# Patient Record
Sex: MALE | Race: WHITE | NOT HISPANIC OR LATINO | Employment: OTHER | URBAN - METROPOLITAN AREA
[De-identification: names, ages, dates, MRNs, and addresses within clinical notes are randomized per-mention and may not be internally consistent; named-entity substitution may affect disease eponyms.]

---

## 2021-07-19 ENCOUNTER — TRANSCRIBE ORDERS (OUTPATIENT)
Dept: ADMINISTRATIVE | Facility: HOSPITAL | Age: 61
End: 2021-07-19

## 2021-07-19 DIAGNOSIS — C20 RECTAL CANCER (HCC): Primary | ICD-10-CM

## 2021-07-29 ENCOUNTER — HOSPITAL ENCOUNTER (OUTPATIENT)
Dept: MRI IMAGING | Facility: HOSPITAL | Age: 61
Discharge: HOME | End: 2021-07-29
Admitting: INTERNAL MEDICINE

## 2021-07-29 DIAGNOSIS — C20 RECTAL CANCER (HCC): ICD-10-CM

## 2021-07-29 PROCEDURE — A9577 INJ MULTIHANCE: HCPCS | Performed by: INTERNAL MEDICINE

## 2021-07-29 PROCEDURE — 0 GADOBENATE DIMEGLUMINE 529 MG/ML SOLUTION: Performed by: INTERNAL MEDICINE

## 2021-07-29 PROCEDURE — 82565 ASSAY OF CREATININE: CPT

## 2021-07-29 PROCEDURE — 72197 MRI PELVIS W/O & W/DYE: CPT

## 2021-07-29 RX ADMIN — GADOBENATE DIMEGLUMINE 18 ML: 529 INJECTION, SOLUTION INTRAVENOUS at 09:54

## 2021-08-01 LAB — CREAT BLDA-MCNC: 0.9 MG/DL (ref 0.6–1.3)

## 2022-03-30 ENCOUNTER — LAB REQUISITION (OUTPATIENT)
Dept: LAB | Facility: HOSPITAL | Age: 62
End: 2022-03-30

## 2022-03-30 DIAGNOSIS — C20 MALIGNANT NEOPLASM OF RECTUM: ICD-10-CM

## 2022-03-31 LAB
CYTO UR: NORMAL
LAB AP CASE REPORT: NORMAL
LAB AP CLINICAL INFORMATION: NORMAL
LAB AP DIAGNOSIS COMMENT: NORMAL
LAB AP SPECIAL STAINS: NORMAL
PATH REPORT.FINAL DX SPEC: NORMAL
PATH REPORT.GROSS SPEC: NORMAL

## 2022-04-01 ENCOUNTER — APPOINTMENT (OUTPATIENT)
Dept: PREADMISSION TESTING | Facility: HOSPITAL | Age: 62
End: 2022-04-01

## 2022-04-04 ENCOUNTER — PRE-ADMISSION TESTING (OUTPATIENT)
Dept: PREADMISSION TESTING | Facility: HOSPITAL | Age: 62
End: 2022-04-04

## 2022-04-04 ENCOUNTER — APPOINTMENT (OUTPATIENT)
Dept: PREADMISSION TESTING | Facility: HOSPITAL | Age: 62
End: 2022-04-04

## 2022-04-04 VITALS — WEIGHT: 199.08 LBS | HEIGHT: 68 IN | BODY MASS INDEX: 30.17 KG/M2

## 2022-04-04 LAB
ABO GROUP BLD: NORMAL
ALBUMIN SERPL-MCNC: 4.5 G/DL (ref 3.5–5.2)
ALBUMIN/GLOB SERPL: 1.7 G/DL
ALP SERPL-CCNC: 87 U/L (ref 39–117)
ALT SERPL W P-5'-P-CCNC: 8 U/L (ref 1–41)
ANION GAP SERPL CALCULATED.3IONS-SCNC: 10 MMOL/L (ref 5–15)
AST SERPL-CCNC: 12 U/L (ref 1–40)
BILIRUB SERPL-MCNC: 0.2 MG/DL (ref 0–1.2)
BLD GP AB SCN SERPL QL: NEGATIVE
BUN SERPL-MCNC: 6 MG/DL (ref 8–23)
BUN/CREAT SERPL: 8.8 (ref 7–25)
CALCIUM SPEC-SCNC: 9.8 MG/DL (ref 8.6–10.5)
CEA SERPL-MCNC: 2.06 NG/ML
CHLORIDE SERPL-SCNC: 104 MMOL/L (ref 98–107)
CO2 SERPL-SCNC: 28 MMOL/L (ref 22–29)
CREAT SERPL-MCNC: 0.68 MG/DL (ref 0.76–1.27)
DEPRECATED RDW RBC AUTO: 46.4 FL (ref 37–54)
EGFRCR SERPLBLD CKD-EPI 2021: 104.4 ML/MIN/1.73
ERYTHROCYTE [DISTWIDTH] IN BLOOD BY AUTOMATED COUNT: 12.8 % (ref 12.3–15.4)
GLOBULIN UR ELPH-MCNC: 2.6 GM/DL
GLUCOSE SERPL-MCNC: 127 MG/DL (ref 65–99)
HBA1C MFR BLD: 6.5 % (ref 4.8–5.6)
HCT VFR BLD AUTO: 43.6 % (ref 37.5–51)
HGB BLD-MCNC: 14.1 G/DL (ref 13–17.7)
MCH RBC QN AUTO: 31.8 PG (ref 26.6–33)
MCHC RBC AUTO-ENTMCNC: 32.3 G/DL (ref 31.5–35.7)
MCV RBC AUTO: 98.4 FL (ref 79–97)
PLATELET # BLD AUTO: 215 10*3/MM3 (ref 140–450)
PMV BLD AUTO: 10.6 FL (ref 6–12)
POTASSIUM SERPL-SCNC: 4.6 MMOL/L (ref 3.5–5.2)
PROT SERPL-MCNC: 7.1 G/DL (ref 6–8.5)
PSA SERPL-MCNC: 0.52 NG/ML (ref 0–4)
RBC # BLD AUTO: 4.43 10*6/MM3 (ref 4.14–5.8)
RH BLD: POSITIVE
SARS-COV-2 RNA PNL SPEC NAA+PROBE: NOT DETECTED
SODIUM SERPL-SCNC: 142 MMOL/L (ref 136–145)
T&S EXPIRATION DATE: NORMAL
WBC NRBC COR # BLD: 8.76 10*3/MM3 (ref 3.4–10.8)

## 2022-04-04 PROCEDURE — 84153 ASSAY OF PSA TOTAL: CPT

## 2022-04-04 PROCEDURE — 86901 BLOOD TYPING SEROLOGIC RH(D): CPT

## 2022-04-04 PROCEDURE — 93010 ELECTROCARDIOGRAM REPORT: CPT | Performed by: INTERNAL MEDICINE

## 2022-04-04 PROCEDURE — C9803 HOPD COVID-19 SPEC COLLECT: HCPCS

## 2022-04-04 PROCEDURE — 82378 CARCINOEMBRYONIC ANTIGEN: CPT

## 2022-04-04 PROCEDURE — 86900 BLOOD TYPING SEROLOGIC ABO: CPT

## 2022-04-04 PROCEDURE — 83036 HEMOGLOBIN GLYCOSYLATED A1C: CPT

## 2022-04-04 PROCEDURE — 80053 COMPREHEN METABOLIC PANEL: CPT

## 2022-04-04 PROCEDURE — 36415 COLL VENOUS BLD VENIPUNCTURE: CPT

## 2022-04-04 PROCEDURE — 93005 ELECTROCARDIOGRAM TRACING: CPT

## 2022-04-04 PROCEDURE — 86850 RBC ANTIBODY SCREEN: CPT

## 2022-04-04 PROCEDURE — U0004 COV-19 TEST NON-CDC HGH THRU: HCPCS

## 2022-04-04 PROCEDURE — 85027 COMPLETE CBC AUTOMATED: CPT

## 2022-04-04 RX ORDER — TAMSULOSIN HYDROCHLORIDE 0.4 MG/1
1 CAPSULE ORAL DAILY
COMMUNITY

## 2022-04-04 RX ORDER — PREDNISONE 10 MG/1
10 TABLET ORAL DAILY
COMMUNITY

## 2022-04-04 RX ORDER — MONTELUKAST SODIUM 10 MG/1
10 TABLET ORAL NIGHTLY
COMMUNITY

## 2022-04-04 RX ORDER — ACETAMINOPHEN 325 MG/1
650 TABLET ORAL EVERY 6 HOURS PRN
COMMUNITY

## 2022-04-04 RX ORDER — AMLODIPINE BESYLATE 10 MG/1
10 TABLET ORAL DAILY
COMMUNITY

## 2022-04-04 RX ORDER — THEOPHYLLINE 400 MG/1
400 TABLET, EXTENDED RELEASE ORAL 2 TIMES DAILY
COMMUNITY

## 2022-04-04 NOTE — PAT
covid in pat.     Wound RN to see patient today after pat appointment.     Gi nurse notified of surgery at this time.     Patient instructed to drink 20 ounces (or until full) of Gatorade and it needs to be completed 1 hour (for Main OR patients) or 2 hours (scheduled  section patients) before given arrival time for procedure (NO RED Gatorade)    Patient verbalized understanding.    Patient viewed general PAT education video as instructed in their preoperative information received from their surgeon.  Patient stated the general PAT education video was viewed in its entirety and survey completed.  Copies of [facility] general education handouts (Incentive Spirometry, Meds to Beds Program, Patient Belongings, Pre-op skin preparation instructions, Blood Glucose testing, Visitor policy, Surgery FAQ, Code H) distributed to patient if not printed. Education related to the PAT pass and skin preparation for surgery (if applicable) completed in PAT as a reinforcement to PAT education video. Patient instructed to return PAT pass provided today as well as completed skin preparation sheet (if applicable) on the day of procedure.     Additionally if patient had not viewed video yet but intended to view it at home or in our waiting area, then referred them to the handout with QR code/link provided during PAT visit.  Instructed patient to complete survey after viewing the video in its entirety.  Encouraged patient/family to read PAT general education handouts thoroughly and notify PAT staff with any questions or concerns. Patient verbalized understanding of all information and priority content.    Patient to apply Chlorhexadine wipes  to surgical area (as instructed) the night before procedure and the AM of procedure. Wipes provided.

## 2022-04-04 NOTE — NURSING NOTE
Woc consulted for pre admission ostomy education    Planned procedure: LAR with colostomy  Surgery date: 04/05/2022    Education performed: anatomy, purpose, immediate post op expectations, other: Diet, pouching different types of pouching typical schedule through the day and return to normal bowel function.    Subjective/ other: Patient is a little apprehensive but asking good questions about ordering and supplies and teaching.  Offered reassurance that we will be there every day he is impatient and help him change his own pouch before leaving.    Reassurance of woc support and follow up visits.     Woc will follow.     Please contact with questions or concerns.

## 2022-04-05 ENCOUNTER — HOSPITAL ENCOUNTER (INPATIENT)
Facility: HOSPITAL | Age: 62
LOS: 4 days | Discharge: HOME HEALTH | End: 2022-04-09
Attending: COLON & RECTAL SURGERY | Admitting: COLON & RECTAL SURGERY

## 2022-04-05 ENCOUNTER — ANESTHESIA (OUTPATIENT)
Dept: PERIOP | Facility: HOSPITAL | Age: 62
End: 2022-04-05

## 2022-04-05 ENCOUNTER — APPOINTMENT (OUTPATIENT)
Dept: GENERAL RADIOLOGY | Facility: HOSPITAL | Age: 62
End: 2022-04-05

## 2022-04-05 ENCOUNTER — ANESTHESIA EVENT (OUTPATIENT)
Dept: PERIOP | Facility: HOSPITAL | Age: 62
End: 2022-04-05

## 2022-04-05 ENCOUNTER — ANESTHESIA EVENT CONVERTED (OUTPATIENT)
Dept: ANESTHESIOLOGY | Facility: HOSPITAL | Age: 62
End: 2022-04-05

## 2022-04-05 DIAGNOSIS — C20 RECTAL CANCER: Primary | ICD-10-CM

## 2022-04-05 PROBLEM — J44.9 COPD (CHRONIC OBSTRUCTIVE PULMONARY DISEASE) (HCC): Status: ACTIVE | Noted: 2022-04-05

## 2022-04-05 PROBLEM — K62.5 RECTAL BLEEDING: Status: ACTIVE | Noted: 2022-04-05

## 2022-04-05 PROBLEM — Z72.0 TOBACCO ABUSE: Status: ACTIVE | Noted: 2022-04-05

## 2022-04-05 PROBLEM — I10 HTN (HYPERTENSION): Status: ACTIVE | Noted: 2022-04-05

## 2022-04-05 LAB
ABO GROUP BLD: NORMAL
QT INTERVAL: 346 MS
QTC INTERVAL: 434 MS
RH BLD: POSITIVE

## 2022-04-05 PROCEDURE — 86900 BLOOD TYPING SEROLOGIC ABO: CPT

## 2022-04-05 PROCEDURE — 25010000002 SODIUM CHLORIDE 0.9 % WITH KCL 20 MEQ 20-0.9 MEQ/L-% SOLUTION: Performed by: COLON & RECTAL SURGERY

## 2022-04-05 PROCEDURE — 25010000002 DEXAMETHASONE PER 1 MG: Performed by: NURSE ANESTHETIST, CERTIFIED REGISTERED

## 2022-04-05 PROCEDURE — 88304 TISSUE EXAM BY PATHOLOGIST: CPT | Performed by: COLON & RECTAL SURGERY

## 2022-04-05 PROCEDURE — 88309 TISSUE EXAM BY PATHOLOGIST: CPT | Performed by: COLON & RECTAL SURGERY

## 2022-04-05 PROCEDURE — 94799 UNLISTED PULMONARY SVC/PX: CPT

## 2022-04-05 PROCEDURE — 0T788DZ DILATION OF BILATERAL URETERS WITH INTRALUMINAL DEVICE, VIA NATURAL OR ARTIFICIAL OPENING ENDOSCOPIC: ICD-10-PCS | Performed by: UROLOGY

## 2022-04-05 PROCEDURE — 0DBN0ZZ EXCISION OF SIGMOID COLON, OPEN APPROACH: ICD-10-PCS | Performed by: COLON & RECTAL SURGERY

## 2022-04-05 PROCEDURE — 25010000002 ONDANSETRON PER 1 MG: Performed by: NURSE ANESTHETIST, CERTIFIED REGISTERED

## 2022-04-05 PROCEDURE — 25010000002 HYDROMORPHONE 1 MG/ML SOLUTION

## 2022-04-05 PROCEDURE — 25010000002 PROPOFOL 10 MG/ML EMULSION: Performed by: NURSE ANESTHETIST, CERTIFIED REGISTERED

## 2022-04-05 PROCEDURE — C1758 CATHETER, URETERAL: HCPCS

## 2022-04-05 PROCEDURE — 25010000002 DEXAMETHASONE SODIUM PHOSPHATE 10 MG/ML SOLUTION: Performed by: ANESTHESIOLOGY

## 2022-04-05 PROCEDURE — 94640 AIRWAY INHALATION TREATMENT: CPT

## 2022-04-05 PROCEDURE — 25010000002 KETOROLAC TROMETHAMINE PER 15 MG: Performed by: COLON & RECTAL SURGERY

## 2022-04-05 PROCEDURE — 86901 BLOOD TYPING SEROLOGIC RH(D): CPT

## 2022-04-05 PROCEDURE — 25010000002 HEPARIN (PORCINE) PER 1000 UNITS: Performed by: COLON & RECTAL SURGERY

## 2022-04-05 PROCEDURE — S0260 H&P FOR SURGERY: HCPCS | Performed by: PHYSICIAN ASSISTANT

## 2022-04-05 PROCEDURE — 0DTJ0ZZ RESECTION OF APPENDIX, OPEN APPROACH: ICD-10-PCS | Performed by: COLON & RECTAL SURGERY

## 2022-04-05 PROCEDURE — 0D1N0Z4 BYPASS SIGMOID COLON TO CUTANEOUS, OPEN APPROACH: ICD-10-PCS | Performed by: COLON & RECTAL SURGERY

## 2022-04-05 PROCEDURE — C1889 IMPLANT/INSERT DEVICE, NOC: HCPCS | Performed by: COLON & RECTAL SURGERY

## 2022-04-05 PROCEDURE — 0DTQ0ZZ RESECTION OF ANUS, OPEN APPROACH: ICD-10-PCS | Performed by: COLON & RECTAL SURGERY

## 2022-04-05 PROCEDURE — 0DTP0ZZ RESECTION OF RECTUM, OPEN APPROACH: ICD-10-PCS | Performed by: COLON & RECTAL SURGERY

## 2022-04-05 PROCEDURE — 25010000002 FENTANYL CITRATE (PF) 50 MCG/ML SOLUTION: Performed by: NURSE ANESTHETIST, CERTIFIED REGISTERED

## 2022-04-05 DEVICE — PROXIMATE LINEAR CUTTER RELOAD, BLUE, 75MM
Type: IMPLANTABLE DEVICE | Site: ABDOMEN | Status: FUNCTIONAL
Brand: PROXIMATE

## 2022-04-05 DEVICE — PROXIMATE RELOADABLE LINEAR CUTTER WITH SAFETY LOCK-OUT, 75MM
Type: IMPLANTABLE DEVICE | Site: ABDOMEN | Status: FUNCTIONAL
Brand: PROXIMATE

## 2022-04-05 RX ORDER — SCOLOPAMINE TRANSDERMAL SYSTEM 1 MG/1
1 PATCH, EXTENDED RELEASE TRANSDERMAL CONTINUOUS
Status: DISCONTINUED | OUTPATIENT
Start: 2022-04-05 | End: 2022-04-08

## 2022-04-05 RX ORDER — ROCURONIUM BROMIDE 10 MG/ML
INJECTION, SOLUTION INTRAVENOUS AS NEEDED
Status: DISCONTINUED | OUTPATIENT
Start: 2022-04-05 | End: 2022-04-05 | Stop reason: SURG

## 2022-04-05 RX ORDER — LIDOCAINE HYDROCHLORIDE 10 MG/ML
0.5 INJECTION, SOLUTION EPIDURAL; INFILTRATION; INTRACAUDAL; PERINEURAL ONCE AS NEEDED
Status: COMPLETED | OUTPATIENT
Start: 2022-04-05 | End: 2022-04-05

## 2022-04-05 RX ORDER — DIAZEPAM 5 MG/1
5 TABLET ORAL EVERY 6 HOURS PRN
Status: DISCONTINUED | OUTPATIENT
Start: 2022-04-05 | End: 2022-04-09 | Stop reason: HOSPADM

## 2022-04-05 RX ORDER — HYDROMORPHONE HYDROCHLORIDE 1 MG/ML
0.5 INJECTION, SOLUTION INTRAMUSCULAR; INTRAVENOUS; SUBCUTANEOUS
Status: DISCONTINUED | OUTPATIENT
Start: 2022-04-05 | End: 2022-04-05 | Stop reason: HOSPADM

## 2022-04-05 RX ORDER — DEXAMETHASONE SODIUM PHOSPHATE 10 MG/ML
INJECTION INTRAMUSCULAR; INTRAVENOUS AS NEEDED
Status: DISCONTINUED | OUTPATIENT
Start: 2022-04-05 | End: 2022-04-05 | Stop reason: SURG

## 2022-04-05 RX ORDER — DEXAMETHASONE SODIUM PHOSPHATE 10 MG/ML
INJECTION, SOLUTION INTRAMUSCULAR; INTRAVENOUS
Status: COMPLETED | OUTPATIENT
Start: 2022-04-05 | End: 2022-04-05

## 2022-04-05 RX ORDER — AMLODIPINE BESYLATE 10 MG/1
10 TABLET ORAL DAILY
Status: DISCONTINUED | OUTPATIENT
Start: 2022-04-06 | End: 2022-04-09 | Stop reason: HOSPADM

## 2022-04-05 RX ORDER — THEOPHYLLINE ANHYDROUS 200 MG
200 TABLET, EXTENDED RELEASE 12 HR ORAL EVERY 12 HOURS SCHEDULED
Status: DISCONTINUED | OUTPATIENT
Start: 2022-04-05 | End: 2022-04-05

## 2022-04-05 RX ORDER — IBUPROFEN 400 MG/1
400 TABLET ORAL EVERY 6 HOURS PRN
Status: DISCONTINUED | OUTPATIENT
Start: 2022-04-05 | End: 2022-04-09 | Stop reason: HOSPADM

## 2022-04-05 RX ORDER — NALOXONE HCL 0.4 MG/ML
0.4 VIAL (ML) INJECTION AS NEEDED
Status: DISCONTINUED | OUTPATIENT
Start: 2022-04-05 | End: 2022-04-05 | Stop reason: HOSPADM

## 2022-04-05 RX ORDER — ACETAMINOPHEN 500 MG
1000 TABLET ORAL ONCE
Status: COMPLETED | OUTPATIENT
Start: 2022-04-05 | End: 2022-04-05

## 2022-04-05 RX ORDER — HYDROMORPHONE HYDROCHLORIDE 1 MG/ML
0.5 INJECTION, SOLUTION INTRAMUSCULAR; INTRAVENOUS; SUBCUTANEOUS
Status: DISCONTINUED | OUTPATIENT
Start: 2022-04-05 | End: 2022-04-09 | Stop reason: HOSPADM

## 2022-04-05 RX ORDER — NALOXONE HCL 0.4 MG/ML
0.4 VIAL (ML) INJECTION
Status: DISCONTINUED | OUTPATIENT
Start: 2022-04-05 | End: 2022-04-09 | Stop reason: HOSPADM

## 2022-04-05 RX ORDER — HEPARIN SODIUM 5000 [USP'U]/ML
5000 INJECTION, SOLUTION INTRAVENOUS; SUBCUTANEOUS ONCE
Status: COMPLETED | OUTPATIENT
Start: 2022-04-05 | End: 2022-04-05

## 2022-04-05 RX ORDER — FENTANYL CITRATE 50 UG/ML
INJECTION, SOLUTION INTRAMUSCULAR; INTRAVENOUS AS NEEDED
Status: DISCONTINUED | OUTPATIENT
Start: 2022-04-05 | End: 2022-04-05 | Stop reason: SURG

## 2022-04-05 RX ORDER — ONDANSETRON 2 MG/ML
INJECTION INTRAMUSCULAR; INTRAVENOUS AS NEEDED
Status: DISCONTINUED | OUTPATIENT
Start: 2022-04-05 | End: 2022-04-05

## 2022-04-05 RX ORDER — ONDANSETRON 2 MG/ML
4 INJECTION INTRAMUSCULAR; INTRAVENOUS ONCE AS NEEDED
Status: DISCONTINUED | OUTPATIENT
Start: 2022-04-05 | End: 2022-04-05 | Stop reason: HOSPADM

## 2022-04-05 RX ORDER — PROMETHAZINE HYDROCHLORIDE 25 MG/1
25 SUPPOSITORY RECTAL ONCE AS NEEDED
Status: DISCONTINUED | OUTPATIENT
Start: 2022-04-05 | End: 2022-04-05 | Stop reason: HOSPADM

## 2022-04-05 RX ORDER — PROMETHAZINE HYDROCHLORIDE 25 MG/1
25 TABLET ORAL ONCE AS NEEDED
Status: DISCONTINUED | OUTPATIENT
Start: 2022-04-05 | End: 2022-04-05 | Stop reason: HOSPADM

## 2022-04-05 RX ORDER — BUPIVACAINE HYDROCHLORIDE 2.5 MG/ML
INJECTION, SOLUTION EPIDURAL; INFILTRATION; INTRACAUDAL
Status: COMPLETED | OUTPATIENT
Start: 2022-04-05 | End: 2022-04-05

## 2022-04-05 RX ORDER — HYDROCODONE BITARTRATE AND ACETAMINOPHEN 7.5; 325 MG/1; MG/1
1 TABLET ORAL EVERY 4 HOURS PRN
Status: DISCONTINUED | OUTPATIENT
Start: 2022-04-05 | End: 2022-04-09 | Stop reason: HOSPADM

## 2022-04-05 RX ORDER — PREGABALIN 75 MG/1
75 CAPSULE ORAL ONCE
Status: COMPLETED | OUTPATIENT
Start: 2022-04-05 | End: 2022-04-05

## 2022-04-05 RX ORDER — MONTELUKAST SODIUM 10 MG/1
10 TABLET ORAL NIGHTLY
Status: DISCONTINUED | OUTPATIENT
Start: 2022-04-06 | End: 2022-04-09 | Stop reason: HOSPADM

## 2022-04-05 RX ORDER — KETOROLAC TROMETHAMINE 15 MG/ML
15 INJECTION, SOLUTION INTRAMUSCULAR; INTRAVENOUS EVERY 6 HOURS
Status: COMPLETED | OUTPATIENT
Start: 2022-04-05 | End: 2022-04-07

## 2022-04-05 RX ORDER — MAGNESIUM HYDROXIDE 1200 MG/15ML
LIQUID ORAL AS NEEDED
Status: DISCONTINUED | OUTPATIENT
Start: 2022-04-05 | End: 2022-04-05 | Stop reason: HOSPADM

## 2022-04-05 RX ORDER — HYDROCODONE BITARTRATE AND ACETAMINOPHEN 5; 325 MG/1; MG/1
1 TABLET ORAL ONCE AS NEEDED
Status: DISCONTINUED | OUTPATIENT
Start: 2022-04-05 | End: 2022-04-05 | Stop reason: HOSPADM

## 2022-04-05 RX ORDER — IPRATROPIUM BROMIDE AND ALBUTEROL SULFATE 2.5; .5 MG/3ML; MG/3ML
3 SOLUTION RESPIRATORY (INHALATION) ONCE AS NEEDED
Status: DISCONTINUED | OUTPATIENT
Start: 2022-04-05 | End: 2022-04-05 | Stop reason: HOSPADM

## 2022-04-05 RX ORDER — ACETAMINOPHEN 325 MG/1
650 TABLET ORAL EVERY 8 HOURS SCHEDULED
Status: DISCONTINUED | OUTPATIENT
Start: 2022-04-05 | End: 2022-04-09 | Stop reason: HOSPADM

## 2022-04-05 RX ORDER — SODIUM CHLORIDE, SODIUM LACTATE, POTASSIUM CHLORIDE, CALCIUM CHLORIDE 600; 310; 30; 20 MG/100ML; MG/100ML; MG/100ML; MG/100ML
9 INJECTION, SOLUTION INTRAVENOUS CONTINUOUS
Status: DISCONTINUED | OUTPATIENT
Start: 2022-04-05 | End: 2022-04-06

## 2022-04-05 RX ORDER — HYDRALAZINE HYDROCHLORIDE 20 MG/ML
5 INJECTION INTRAMUSCULAR; INTRAVENOUS
Status: DISCONTINUED | OUTPATIENT
Start: 2022-04-05 | End: 2022-04-05 | Stop reason: HOSPADM

## 2022-04-05 RX ORDER — ONDANSETRON 4 MG/1
4 TABLET, FILM COATED ORAL EVERY 6 HOURS PRN
Status: DISCONTINUED | OUTPATIENT
Start: 2022-04-05 | End: 2022-04-09 | Stop reason: HOSPADM

## 2022-04-05 RX ORDER — ESMOLOL HYDROCHLORIDE 10 MG/ML
INJECTION INTRAVENOUS AS NEEDED
Status: DISCONTINUED | OUTPATIENT
Start: 2022-04-05 | End: 2022-04-05 | Stop reason: SURG

## 2022-04-05 RX ORDER — SODIUM CHLORIDE AND POTASSIUM CHLORIDE 150; 900 MG/100ML; MG/100ML
100 INJECTION, SOLUTION INTRAVENOUS CONTINUOUS
Status: DISCONTINUED | OUTPATIENT
Start: 2022-04-05 | End: 2022-04-08

## 2022-04-05 RX ORDER — MEPERIDINE HYDROCHLORIDE 25 MG/ML
12.5 INJECTION INTRAMUSCULAR; INTRAVENOUS; SUBCUTANEOUS
Status: DISCONTINUED | OUTPATIENT
Start: 2022-04-05 | End: 2022-04-05 | Stop reason: HOSPADM

## 2022-04-05 RX ORDER — THEOPHYLLINE ANHYDROUS 200 MG
400 TABLET, EXTENDED RELEASE 12 HR ORAL EVERY 12 HOURS SCHEDULED
Status: DISCONTINUED | OUTPATIENT
Start: 2022-04-05 | End: 2022-04-05

## 2022-04-05 RX ORDER — LABETALOL HYDROCHLORIDE 5 MG/ML
10 INJECTION, SOLUTION INTRAVENOUS EVERY 4 HOURS PRN
Status: DISCONTINUED | OUTPATIENT
Start: 2022-04-05 | End: 2022-04-09 | Stop reason: HOSPADM

## 2022-04-05 RX ORDER — SODIUM CHLORIDE 0.9 % (FLUSH) 0.9 %
3 SYRINGE (ML) INJECTION EVERY 12 HOURS SCHEDULED
Status: DISCONTINUED | OUTPATIENT
Start: 2022-04-05 | End: 2022-04-05 | Stop reason: HOSPADM

## 2022-04-05 RX ORDER — FAMOTIDINE 10 MG/ML
20 INJECTION, SOLUTION INTRAVENOUS ONCE
Status: CANCELLED | OUTPATIENT
Start: 2022-04-05 | End: 2022-04-05

## 2022-04-05 RX ORDER — IPRATROPIUM BROMIDE AND ALBUTEROL SULFATE 2.5; .5 MG/3ML; MG/3ML
3 SOLUTION RESPIRATORY (INHALATION)
Status: DISCONTINUED | OUTPATIENT
Start: 2022-04-05 | End: 2022-04-09 | Stop reason: HOSPADM

## 2022-04-05 RX ORDER — ONDANSETRON 2 MG/ML
4 INJECTION INTRAMUSCULAR; INTRAVENOUS EVERY 6 HOURS PRN
Status: DISCONTINUED | OUTPATIENT
Start: 2022-04-05 | End: 2022-04-09 | Stop reason: HOSPADM

## 2022-04-05 RX ORDER — LIDOCAINE HYDROCHLORIDE 10 MG/ML
INJECTION, SOLUTION EPIDURAL; INFILTRATION; INTRACAUDAL; PERINEURAL AS NEEDED
Status: DISCONTINUED | OUTPATIENT
Start: 2022-04-05 | End: 2022-04-05 | Stop reason: SURG

## 2022-04-05 RX ORDER — PROPOFOL 10 MG/ML
VIAL (ML) INTRAVENOUS AS NEEDED
Status: DISCONTINUED | OUTPATIENT
Start: 2022-04-05 | End: 2022-04-05 | Stop reason: SURG

## 2022-04-05 RX ORDER — GABAPENTIN 300 MG/1
300 CAPSULE ORAL 3 TIMES DAILY
Status: COMPLETED | OUTPATIENT
Start: 2022-04-05 | End: 2022-04-08

## 2022-04-05 RX ORDER — DROPERIDOL 2.5 MG/ML
0.62 INJECTION, SOLUTION INTRAMUSCULAR; INTRAVENOUS ONCE AS NEEDED
Status: DISCONTINUED | OUTPATIENT
Start: 2022-04-05 | End: 2022-04-05 | Stop reason: HOSPADM

## 2022-04-05 RX ORDER — MELOXICAM 15 MG/1
15 TABLET ORAL ONCE
Status: COMPLETED | OUTPATIENT
Start: 2022-04-05 | End: 2022-04-05

## 2022-04-05 RX ORDER — FAMOTIDINE 20 MG/1
20 TABLET, FILM COATED ORAL 2 TIMES DAILY
Status: DISCONTINUED | OUTPATIENT
Start: 2022-04-05 | End: 2022-04-09 | Stop reason: HOSPADM

## 2022-04-05 RX ORDER — ALVIMOPAN 12 MG/1
12 CAPSULE ORAL ONCE
Status: COMPLETED | OUTPATIENT
Start: 2022-04-05 | End: 2022-04-05

## 2022-04-05 RX ORDER — SODIUM CHLORIDE 0.9 % (FLUSH) 0.9 %
3-10 SYRINGE (ML) INJECTION AS NEEDED
Status: DISCONTINUED | OUTPATIENT
Start: 2022-04-05 | End: 2022-04-05 | Stop reason: HOSPADM

## 2022-04-05 RX ORDER — TAMSULOSIN HYDROCHLORIDE 0.4 MG/1
0.4 CAPSULE ORAL DAILY
Status: DISCONTINUED | OUTPATIENT
Start: 2022-04-06 | End: 2022-04-09 | Stop reason: HOSPADM

## 2022-04-05 RX ORDER — BUDESONIDE AND FORMOTEROL FUMARATE DIHYDRATE 160; 4.5 UG/1; UG/1
2 AEROSOL RESPIRATORY (INHALATION)
Status: DISCONTINUED | OUTPATIENT
Start: 2022-04-05 | End: 2022-04-09 | Stop reason: HOSPADM

## 2022-04-05 RX ORDER — CLINDAMYCIN PHOSPHATE 900 MG/50ML
900 INJECTION INTRAVENOUS ONCE
Status: COMPLETED | OUTPATIENT
Start: 2022-04-05 | End: 2022-04-05

## 2022-04-05 RX ORDER — DROPERIDOL 2.5 MG/ML
0.62 INJECTION, SOLUTION INTRAMUSCULAR; INTRAVENOUS AS NEEDED
Status: DISCONTINUED | OUTPATIENT
Start: 2022-04-05 | End: 2022-04-05 | Stop reason: HOSPADM

## 2022-04-05 RX ORDER — ONDANSETRON 2 MG/ML
INJECTION INTRAMUSCULAR; INTRAVENOUS AS NEEDED
Status: DISCONTINUED | OUTPATIENT
Start: 2022-04-05 | End: 2022-04-05 | Stop reason: SURG

## 2022-04-05 RX ORDER — SODIUM CHLORIDE 0.9 % (FLUSH) 0.9 %
10 SYRINGE (ML) INJECTION EVERY 12 HOURS SCHEDULED
Status: DISCONTINUED | OUTPATIENT
Start: 2022-04-05 | End: 2022-04-05 | Stop reason: HOSPADM

## 2022-04-05 RX ORDER — SODIUM CHLORIDE 0.9 % (FLUSH) 0.9 %
10 SYRINGE (ML) INJECTION AS NEEDED
Status: DISCONTINUED | OUTPATIENT
Start: 2022-04-05 | End: 2022-04-05 | Stop reason: HOSPADM

## 2022-04-05 RX ORDER — MIDAZOLAM HYDROCHLORIDE 1 MG/ML
1 INJECTION INTRAMUSCULAR; INTRAVENOUS
Status: DISCONTINUED | OUTPATIENT
Start: 2022-04-05 | End: 2022-04-05 | Stop reason: HOSPADM

## 2022-04-05 RX ORDER — LABETALOL HYDROCHLORIDE 5 MG/ML
5 INJECTION, SOLUTION INTRAVENOUS
Status: DISCONTINUED | OUTPATIENT
Start: 2022-04-05 | End: 2022-04-05 | Stop reason: HOSPADM

## 2022-04-05 RX ORDER — FAMOTIDINE 20 MG/1
20 TABLET, FILM COATED ORAL ONCE
Status: COMPLETED | OUTPATIENT
Start: 2022-04-05 | End: 2022-04-05

## 2022-04-05 RX ORDER — HEPARIN SODIUM 5000 [USP'U]/ML
5000 INJECTION, SOLUTION INTRAVENOUS; SUBCUTANEOUS EVERY 8 HOURS SCHEDULED
Status: DISCONTINUED | OUTPATIENT
Start: 2022-04-06 | End: 2022-04-09 | Stop reason: HOSPADM

## 2022-04-05 RX ORDER — FENTANYL CITRATE 50 UG/ML
50 INJECTION, SOLUTION INTRAMUSCULAR; INTRAVENOUS
Status: DISCONTINUED | OUTPATIENT
Start: 2022-04-05 | End: 2022-04-05 | Stop reason: HOSPADM

## 2022-04-05 RX ADMIN — SODIUM CHLORIDE, POTASSIUM CHLORIDE, SODIUM LACTATE AND CALCIUM CHLORIDE: 600; 310; 30; 20 INJECTION, SOLUTION INTRAVENOUS at 13:50

## 2022-04-05 RX ADMIN — ROCURONIUM BROMIDE 30 MG: 10 INJECTION, SOLUTION INTRAVENOUS at 13:37

## 2022-04-05 RX ADMIN — FAMOTIDINE 20 MG: 20 TABLET, FILM COATED ORAL at 10:12

## 2022-04-05 RX ADMIN — ROCURONIUM BROMIDE 50 MG: 10 INJECTION, SOLUTION INTRAVENOUS at 12:50

## 2022-04-05 RX ADMIN — ALVIMOPAN 12 MG: 12 CAPSULE ORAL at 10:11

## 2022-04-05 RX ADMIN — BUPIVACAINE HYDROCHLORIDE 60 ML: 2.5 INJECTION, SOLUTION EPIDURAL; INFILTRATION; INTRACAUDAL; PERINEURAL at 12:56

## 2022-04-05 RX ADMIN — LIDOCAINE HYDROCHLORIDE 0.5 ML: 10 INJECTION, SOLUTION EPIDURAL; INFILTRATION; INTRACAUDAL; PERINEURAL at 10:13

## 2022-04-05 RX ADMIN — ROCURONIUM BROMIDE 20 MG: 10 INJECTION, SOLUTION INTRAVENOUS at 15:34

## 2022-04-05 RX ADMIN — SUGAMMADEX 400 MG: 100 INJECTION, SOLUTION INTRAVENOUS at 15:53

## 2022-04-05 RX ADMIN — IPRATROPIUM BROMIDE AND ALBUTEROL SULFATE 3 ML: .5; 2.5 SOLUTION RESPIRATORY (INHALATION) at 20:07

## 2022-04-05 RX ADMIN — HYDROMORPHONE HYDROCHLORIDE 0.5 MG: 1 INJECTION, SOLUTION INTRAMUSCULAR; INTRAVENOUS; SUBCUTANEOUS at 16:55

## 2022-04-05 RX ADMIN — LIDOCAINE HYDROCHLORIDE 50 MG: 10 INJECTION, SOLUTION EPIDURAL; INFILTRATION; INTRACAUDAL; PERINEURAL at 12:50

## 2022-04-05 RX ADMIN — HYDROCODONE BITARTRATE AND ACETAMINOPHEN 1 TABLET: 7.5; 325 TABLET ORAL at 22:18

## 2022-04-05 RX ADMIN — SODIUM CHLORIDE, POTASSIUM CHLORIDE, SODIUM LACTATE AND CALCIUM CHLORIDE 9 ML/HR: 600; 310; 30; 20 INJECTION, SOLUTION INTRAVENOUS at 10:14

## 2022-04-05 RX ADMIN — DEXAMETHASONE SODIUM PHOSPHATE 4 MG: 10 INJECTION INTRAMUSCULAR; INTRAVENOUS at 13:12

## 2022-04-05 RX ADMIN — SODIUM CHLORIDE, POTASSIUM CHLORIDE, SODIUM LACTATE AND CALCIUM CHLORIDE: 600; 310; 30; 20 INJECTION, SOLUTION INTRAVENOUS at 15:03

## 2022-04-05 RX ADMIN — ACETAMINOPHEN 1000 MG: 500 TABLET ORAL at 10:12

## 2022-04-05 RX ADMIN — ACETAMINOPHEN 650 MG: 325 TABLET ORAL at 21:34

## 2022-04-05 RX ADMIN — CLINDAMYCIN PHOSPHATE 900 MG: 900 INJECTION, SOLUTION INTRAVENOUS at 13:11

## 2022-04-05 RX ADMIN — KETOROLAC TROMETHAMINE 15 MG: 15 INJECTION, SOLUTION INTRAMUSCULAR; INTRAVENOUS at 21:34

## 2022-04-05 RX ADMIN — MELOXICAM 15 MG: 15 TABLET ORAL at 10:12

## 2022-04-05 RX ADMIN — SCOPALAMINE 1 PATCH: 1 PATCH, EXTENDED RELEASE TRANSDERMAL at 10:11

## 2022-04-05 RX ADMIN — PREGABALIN 75 MG: 75 CAPSULE ORAL at 10:12

## 2022-04-05 RX ADMIN — PROPOFOL 150 MG: 10 INJECTION, EMULSION INTRAVENOUS at 12:50

## 2022-04-05 RX ADMIN — POTASSIUM CHLORIDE AND SODIUM CHLORIDE 100 ML/HR: 900; 150 INJECTION, SOLUTION INTRAVENOUS at 18:32

## 2022-04-05 RX ADMIN — FAMOTIDINE 20 MG: 20 TABLET, FILM COATED ORAL at 21:34

## 2022-04-05 RX ADMIN — ONDANSETRON 4 MG: 2 INJECTION INTRAMUSCULAR; INTRAVENOUS at 15:42

## 2022-04-05 RX ADMIN — HEPARIN SODIUM 5000 UNITS: 5000 INJECTION, SOLUTION INTRAVENOUS; SUBCUTANEOUS at 10:12

## 2022-04-05 RX ADMIN — DIAZEPAM 5 MG: 5 TABLET ORAL at 23:01

## 2022-04-05 RX ADMIN — DEXAMETHASONE SODIUM PHOSPHATE 4 MG: 10 INJECTION, SOLUTION INTRAMUSCULAR; INTRAVENOUS at 12:56

## 2022-04-05 RX ADMIN — ESMOLOL HYDROCHLORIDE 100 MG: 10 INJECTION, SOLUTION INTRAVENOUS at 12:50

## 2022-04-05 RX ADMIN — GABAPENTIN 300 MG: 300 CAPSULE ORAL at 21:34

## 2022-04-05 RX ADMIN — FENTANYL CITRATE 100 MCG: 50 INJECTION, SOLUTION INTRAMUSCULAR; INTRAVENOUS at 13:15

## 2022-04-05 NOTE — OP NOTE
COLON RESECTION LOW ANTERIOR  Procedure Note    Alfredo Sandhu  4/5/2022    Pre-op Diagnosis:   Pelvic malignancy    Post-op Diagnosis:     Pelvic malignancy    Procedure/CPT® Codes:      Procedure(s):  Cystoscopy with bilateral ureteral stent placement    Surgeon(s):  Kwadwo Rosenberg Jr., MD Belin, Bruce M, MD    Anesthesia: General with Block    Staff:   Circulator: Ruby Lopez RN; Cinthia Nolan RN; Chapincito Shpiman RN; Kalli Ceja RN  Scrub Person: Carolina Clark; Josh Roque  Nursing Assistant: Fran Barry PCT; Rosangela Reeder  Assistant: Tracy Watts RNFA    Assistant: Tracy Watts RNFA Was needed to assist in this case with retraction, exposure, instrument placement.    Estimated Blood Loss: minimal  Urine Voided: * No values recorded between 4/5/2022 12:48 PM and 4/5/2022  4:00 PM *    Specimens:                      Drains:   Closed/Suction Drain 1 LLQ Bulb (Active)   Dressing Status Clean;Intact;Dry 04/05/22 1629   Drainage Appearance Bloody 04/05/22 1629   Status To bulb suction 04/05/22 1629       Open Drain Rectal (Active)   Site Description Unable to view 04/05/22 1629   Dressing Status Intact 04/05/22 1629       Colostomy LLQ (Active)   Stomal Appliance 1 piece;Intact 04/05/22 1629   Stoma Appearance round;pink;red;moist 04/05/22 1629   Peristomal Assessment Intact;Clean 04/05/22 1629       Findings: Cystoscopy was performed after resection of the tumor and we did not see any injury to the bulbar urethra, prostatic urethra posterior bladder wall    Complications: None    History: This was an intraoperative consult for patient who has pelvic malignancy.  Appears to invade prostate on imaging.  We have been asked to place ureteral stents for guidance during colorectal surgery.    Operative Report: Patient's in this dorsolithotomy position general anesthesia been induced.  He was sterilely prepped and draped in normal fashion.  Scope inserted the patient with her  bladder atraumatically.  There was no obvious involvement of tumor within the bulbar urethra, prostatic urethra, posterior bladder.  Both ureteral she is right ineffective orthotopic in nature.  Under direct vision Pollick catheters were used to serve as ureteral stents and they were advanced to 20 cm on both sides.  There allowed to drain into an Edelmann catheter.  See colorectal surgery operative note for details of the rest of the operation.    I was asked to come back into the operating room after the patient had had the low anterior resection completed.  There had been a perineal incision made.  I was asked to perform cystoscopy to make sure that there was no injury to the bulbar urethra, prostatic urethra or posterior bladder.  Stents were in good position.  We went ahead and placed our scope into the urethra and did not see any injury in the bulbar urethra prostatic urethra or posterior bladder.  The stents were removed.  A Chow catheter was placed.  See colorectal surgery operative note for the details regarding the remainder of the procedure.    Kwadwo Rosenberg Jr., MD     Date: 4/5/2022  Time: 16:38 EDT

## 2022-04-05 NOTE — OP NOTE
[facility] ([facility]    EXPLORATORY LAPAROTOMY  ABDOMINAL PERINEAL RESECTION  APPENDECTOMY  Omental flap      Procedure Note    Alfredo Sandhu  4/5/2022    Pre-op Diagnosis:   Rectal cancer presenting with necrosis/abscess on imaging between the rectum and the prostate at initial presentation.  Total neoadjuvant therapy in [place].  Presenting at an interval to the emergency room in [place] with urinary retention and subsequent motivated for follow-up back to me.  Tobacco use, unvaccinated for Covid, COPD  Pelvic pain.  MRI T4N0 8.7 cm above the anal verge.   5cm abscess versus necrosis versus tumor between the anterior rectum and prostate.  Preoperative evaluation including MRI and presentation in multidisciplinary tumor board.  Patient refuses consideration of pelvic exenteration /2 stomas,  BMI 30.  No evidence of distant disease on CT.  Reviewed with Dr Rosenberg prior to the patient's office visit after reviewing the MRI and again thereafter.  Care coordinated today- even without patiehnts willingness for pelvic exenteration.  Urolology input / cystoscopy and stents and subsequent evaluation toward the end of the procedure of the urethra following complex dissection/APR.    Post-op Diagnosis:     APR, hard irregular tissue fixed to the prostate at the anterior rectum.  Unresectable for clear margins.  Palliative    Anesthesia: General with Block    Staff:   Circulator: Ruby Lopez RN; Cinthai Nolan RN; Chapincito Shipman, EVANS; Kalli Ceja, EVANS  Scrub Person: Carolina Clark; Josh Roque  Nursing Assistant: Fran Barry PCT; Rosangela Reeder  Assistant: Tracy Watts RNFA    Assistant: Tracy Watts RNFA was responsible for performing the following activities: Irrigation, suction, exposure, approximation of tissue and their skilled assistance was necessary for the success of this case.    Estimated Blood Loss: 200 cc    Specimens: Appendix,  rectosigmoid and anal canal        Drains: Pelvic MONICA drain    Findings: As above    Complications: None evident    The procedure was reviewed in the office and reiterated at the bedside.    The patient been marked and counseled by the stomal therapist.    We Banda to the operating room with antibiotic and DVT prophylaxis.    Block was performed by anesthesia.  Urologic catheters were placed.  There is no gross impingement on the urethra noted at catheter placement.    Midline exploration was undertaken in the lower abdomen and pelvis.    There was mass-effect in the anterior rectum.    The small bowel was run with food content in the small bowel, this was mobile, this did not seem to represent an intraluminal tumor.    There is no evidence of small bowel disease or pathology or strictures.    The hepatobiliary region was without evidence of metastatic disease.    The left colon was mobilized.  The ileocolic region was mobilized to provide exposure to the pelvis.    Both bilateral urologic catheters were appreciated.  Appendectomy was performed with Enseal division of the mesoappendix and CARYL division of the base of the cecum at the appendix.    The left colon was mobilized away from the kidney and the ureter and the rectosigmoid was mobilized.    The distal left colon was divided with CARYL.  The left colon was released from the retroperitoneum/duodenum in anticipation of colostomy.    High ligation of the inferior mesenteric artery was performed by the duodenum.    The dissection continued over the retroperitoneum and presacral plane with preservation of the hypogastric nerves.    The large pelvic mass affect filled the mid to lower pelvis.  Dissection could be achieved posteriorly, posterior laterally, there is no clear plane anteriorly.    Decision was made for APR as opposed to low anterior resection and closure of the anal canal.  Access from the perineum was needed to achieve resection.    Dissection was  performed around the anal canal through the puborectalis joining the posterior plane, posterior lateral plane, lateral plane    The distal rectum was released from the anterior tissues without impingement on the urethra.    A plane was developed through the necrotic tissue in the anterior rectum juxtaposed to the prostatic region with gross defect in the rectal wall.    The resection was complete and the specimen was delivered.    Dr Rosenberg performed evaluation from the perineum and inspected the urethra and bladder.  There was no evidence of significant impingement or traumatic change to the bladder or the urethra with evaluation including cystoscopy.    After irrigation across the pelvics from the abdomen to the perineum with Betadine and then saline, 3 L of fluid were utilized.    The puborectalis was reapproximated with figure-of-eight 0 Vicryl.    10 flat MONICA was placed in the deep pelvis.    Omental flap was developed and delivered in the left lateral/posterior abdomen to the deep pelvis and filled the pelvis.    This should exclude the small bowel.    The colostomy was developed through the rectus at the preoperatively identified site.    The midline was closed with internal retention of 0 Vicryl no 1 loop PDS, the incision was irrigated with dilute antiseptic solution, the skin approximated with skin clips leaving gaps to permit drainage stented open with a Telfa wick.    The colostomy was matured with 3-0 Vicryl.    Digital exam demonstrated patency.    The lap counts were announced as correct.  There was some question with the final counts/change of personnel/documentation of count, the final count was thus determined to be incorrect and imaging is underway with plan for radiology review of complete imaging prior to leaving the OR.      Augustus Breaux MD     Date: 4/5/2022  Time: 16:04 EDT

## 2022-04-05 NOTE — NURSING NOTE
Stoma marking:      Patient marked in his lower LUQ for colostomy setting.     I avoided his waistline and his upper LUQ has lm subtle crease. This was avoided.     Patient was able to see and point to selected site.     WOC will follow daily for stomal support and teaching.     Thanks

## 2022-04-05 NOTE — ANESTHESIA PROCEDURE NOTES
Airway  Urgency: elective    Date/Time: 4/5/2022 12:54 PM  Airway not difficult    General Information and Staff    Patient location during procedure: OR  Anesthesiologist: Joy Hinojosa MD  CRNA: Brenden Wilson CRNA    Indications and Patient Condition  Indications for airway management: airway protection    Preoxygenated: yes  MILS not maintained throughout  Mask difficulty assessment: 2 - vent by mask + OA or adjuvant +/- NMBA    Final Airway Details  Final airway type: endotracheal airway      Successful airway: ETT  Cuffed: yes   Successful intubation technique: direct laryngoscopy  Endotracheal tube insertion site: oral  Blade: Hasmukh  Blade size: 3  ETT size (mm): 7.5  Cormack-Lehane Classification: grade I - full view of glottis  Placement verified by: chest auscultation and capnometry   Measured from: lips  ETT/EBT  to lips (cm): 20  Number of attempts at approach: 1  Assessment: lips, teeth, and gum same as pre-op and atraumatic intubation    Additional Comments  Negative epigastric sounds, Breath sound equal bilaterally with symmetric chest rise and fall

## 2022-04-05 NOTE — ANESTHESIA PREPROCEDURE EVALUATION
Anesthesia Evaluation     Patient summary reviewed and Nursing notes reviewed   no history of anesthetic complications:  NPO Solid Status: > 8 hours  NPO Liquid Status: > 2 hours           Airway   Mallampati: II  TM distance: >3 FB  Neck ROM: full  No difficulty expected  Dental      Pulmonary - normal exam   (+) a smoker, COPD,   Cardiovascular - negative cardio ROS and normal exam    ECG reviewed        Neuro/Psych- negative ROS  GI/Hepatic/Renal/Endo    (+) obesity,       Musculoskeletal     Abdominal    Substance History      OB/GYN          Other      history of cancer                  Anesthesia Plan    ASA 3     general with block     intravenous induction     Anesthetic plan, all risks, benefits, and alternatives have been provided, discussed and informed consent has been obtained with: patient.    Plan discussed with CRNA.        CODE STATUS:

## 2022-04-05 NOTE — H&P
Admission HP     Patient Name: Alfredo Sandhu  MRN: [id number]  : 1960  DOS: 2022    Attending: Augustus Breaux MD    Primary Care Provider: Kenia Galarza APRN      Patient Care Team:  Kenia Galarza APRN as PCP - General (Nurse Practitioner)    Chief complaint: Rectal cancer with abscess    Subjective   Patient is a pleasant 61 y.o. male presented for scheduled surgery by Dr. Breaux.    Per his note, reviewed (Pre-op Diagnosis:   Rectal cancer presenting with necrosis/abscess on imaging between the rectum and the prostate at initial presentation.  Total neoadjuvant therapy in [place].  Presenting at an interval to the emergency room in [place] with urinary retention and subsequent motivated for follow-up back to me.  Tobacco use, unvaccinated for Covid, COPD  Pelvic pain.  MRI T4N0 8.7 cm above the anal verge.   5cm abscess versus necrosis versus tumor between the anterior rectum and prostate.  Preoperative evaluation including MRI and presentation in multidisciplinary tumor board.  Patient refuses consideration of pelvic exenteration /2 stomas,  BMI 30.  No evidence of distant disease on CT.  Reviewed with Dr Rosenberg prior to the patient's office visit after reviewing the MRI and again thereafter.  Care coordinated today- even without patiehnts willingness for pelvic exenteration.  Urolology input / cystoscopy and stents and subsequent evaluation toward the end of the procedure of the urethra following complex dissection/APR.     Post-op Diagnosis:     APR, hard irregular tissue fixed to the prostate at the anterior rectum.  Unresectable for clear margins.  Palliative) end of copied portion from Dr. Breaux's note    Patient underwent very complex surgical procedure including exploratory laparotomy, abdominal perineal resection, appendectomy, and omental flap  Surgery was done by Dr. Breaux.    Patient also underwent cystoscopy with ureteral catheter insertion/temporary stent placement  by Dr. Shakira Hart    Seen in PACU postoperatively, still quite sedated.    He is being admitted for further management.    Allergies   Allergen Reactions   • Penicillins Hives and Swelling       Meds:  Medications Prior to Admission   Medication Sig Dispense Refill Last Dose   • acetaminophen (TYLENOL) 325 MG tablet Take 650 mg by mouth Every 6 (Six) Hours As Needed for Mild Pain .   4/5/2022 at 0530   • amLODIPine (NORVASC) 10 MG tablet Take 10 mg by mouth Daily.   4/5/2022 at 0530   • Fluticasone Furoate-Vilanterol (Breo Ellipta) 200-25 MCG/INH inhaler Inhale 1 puff Daily.   4/4/2022 at 0800   • ipratropium-albuterol (COMBIVENT RESPIMAT)  MCG/ACT inhaler Inhale 1 puff 4 (Four) Times a Day As Needed for Wheezing.   4/5/2022 at 0920   • montelukast (SINGULAIR) 10 MG tablet Take 10 mg by mouth Every Night.   4/5/2022 at 0530   • predniSONE (DELTASONE) 10 MG tablet Take 10 mg by mouth Daily.   4/5/2022 at 0530   • tamsulosin (FLOMAX) 0.4 MG capsule 24 hr capsule Take 1 capsule by mouth Daily.   4/5/2022 at 0530   • theophylline (UNIPHYL) 400 MG 24 hr tablet Take 400 mg by mouth 2 (Two) Times a Day.   4/5/2022 at 0530         Past Medical History:   Diagnosis Date   • Cancer (HCC)     rectal   • COPD (chronic obstructive pulmonary disease) (HCC)    • Hearing aid worn     bilat   • [place] (hard of hearing)     bilat hearing aids   • Wears glasses     readers     Past Surgical History:   Procedure Laterality Date   • CATARACT EXTRACTION, BILATERAL Bilateral    • COLONOSCOPY       History reviewed. No pertinent family history.  Social History     Tobacco Use   • Smoking status: Current Every Day Smoker     Packs/day: 0.50     Years: 40.00     Pack years: 20.00     Types: Cigarettes   • Smokeless tobacco: Never Used   Vaping Use   • Vaping Use: Never used   Substance Use Topics   • Alcohol use: Yes     Comment: occasional   • Drug use: Never       Review of Systems  Review of systems could not be obtained due to    "patient sedation status.    Vital Signs  /81 (BP Location: Right arm, Patient Position: Lying)   Pulse 95   Temp 98.1 °F (36.7 °C) (Temporal)   Resp 18   Ht 172.7 cm (68\")   Wt 90.3 kg (199 lb)   SpO2 94%   BMI 30.26 kg/m²     Physical Exam:    General Appearance:   Sedated when seen postop, in no acute distress   Head:    Normocephalic, without obvious abnormality, atraumatic   Eyes:            Lids and lashes normal, conjunctivae and sclerae normal, no   icterus, no pallor, corneas clear   Ears:    Ears appear intact with no abnormalities noted   Throat:   No oral lesions, no thrush, oral mucosa moist   Neck:   No adenopathy, supple, trachea midline, no thyromegaly         Lungs:     Clear to auscultation,respirations regular, even and       unlabored.  Few rhonchi..    Heart:    Regular rhythm and normal rate, normal S1 and S2, no murmur    Abdomen:      Soft, clean dry intact dressing over midline incision.  Colostomy with bag present.  MONICA drain present.   Genitalia:    Deferred  Chow catheter with blood-tinged urine.   Extremities:  No edema, no cyanosis, no              redness   Pulses:   Pulses palpable and equal bilaterally   Skin:   No bleeding, bruising or rash         Results from last 7 days   Lab Units 04/04/22  1607   WBC 10*3/mm3 8.76   HEMOGLOBIN g/dL 14.1   HEMATOCRIT % 43.6   PLATELETS 10*3/mm3 215     Results from last 7 days   Lab Units 04/04/22  1607   SODIUM mmol/L 142   POTASSIUM mmol/L 4.6   CHLORIDE mmol/L 104   CO2 mmol/L 28.0   BUN mg/dL 6*   CREATININE mg/dL 0.68*   CALCIUM mg/dL 9.8   BILIRUBIN mg/dL 0.2   ALK PHOS U/L 87   ALT (SGPT) U/L 8   AST (SGOT) U/L 12   GLUCOSE mg/dL 127*     Lab Results   Component Value Date    HGBA1C 6.50 (H) 04/04/2022       Assessment and Plan:   Status post exploratory laparotomy with LAR, total mesorectal resection, colostomy  Status post cystoscopy, ureteral catheter placement bilaterally    Rectal cancer (HCC)    COPD (chronic obstructive " pulmonary disease) (HCC)    Tobacco abuse    HTN (hypertension)    Rectal bleeding      Plan  Postop management to include  1. Ambulation, several times daily  2. Pain control-prns   3. IS-encourage  4. DVT proph- Mechanical, subcutaneous heparin  5. Bowel regimen, alvimopan  6. Resume home medications as appropriate  7. Monitor post-op labs  8. DC planning   9. Diet, Clears, advance diet as tolerated.  IVF initially, monitor volume status.    - Hypertension:  Resume home medications as appropriate, formulary substitution when indicated.  Holding parameters.  Prn medications for elevated blood pressure.    -COPD, ongoing tobacco abuse:  We will schedule bronchodilators, monitor oxygen status, encourage incentive spirometer.  Nicotine patch if desired.    -New stoma:  Wound care stoma nurse consult for stoma care and education throughout patient's hospitalization.    -Chow catheter management postop, voiding trial at the timing dictated by urology and colorectal surgery.  Resume Flomax    Highly complex case    Dragon disclaimer:  Part of this encounter note is an electronic transcription/translation of spoken language to printed text. The electronic translation of spoken language may permit erroneous, or at times, nonsensical words or phrases to be inadvertently transcribed; Although I have reviewed the note for such errors, some may still exist.    Joe Chery MD  04/05/22  14:50 EDT

## 2022-04-05 NOTE — PAYOR COMM NOTE
"Notification as secondary  Lelo Chaney RN CM  Phone [phone]  Fax [phone]    Lobito Thakkar (61 y.o. Male)             Date of Birth   1960    Social Security Number       Address   [address] [place] [address]    Home Phone   [phone]    MRN   [id number]       Sikh   Non-Yazidism    Marital Status                               Admission Date   4/5/22    Admission Type   Elective    Admitting Provider   Augustus Breaux MD    Attending Provider   Augustus Breaux MD    Department, Room/Bed   [facility] OR, [facility] OR/MAIN OR       Discharge Date       Discharge Disposition       Discharge Destination                               Attending Provider: Augustus Breaux MD    Allergies: Penicillins    Isolation: None   Infection: None   Code Status: Not on file   Advance Care Planning Activity    Ht: 172.7 cm (68\")   Wt: 90.3 kg (199 lb)    Admission Cmt: None   Principal Problem: Rectal cancer (HCC) [C20]                 Active Insurance as of 4/5/2022     Primary Coverage     Payor Plan Insurance Group Employer/Plan Group    ANTHEM MEDICARE REPLACEMENT ANTHEM MEDICARE ADVANTAGE [id number]     Payor Plan Address Payor Plan Phone Number Payor Plan Fax Number Effective Dates    [address] [phone]  1/1/2020 - None Entered    [facility] [id number]       Subscriber Name Subscriber Birth Date Member ID       LOBITO THAKKAR 1960 [id number]                 Emergency Contacts      (Rel.) Home Phone Work Phone Mobile Phone    BRITNEY THAKKAR (Spouse) -- -- [phone]               Operative/Procedure Notes (last 24 hours)      Augustus Breaux MD at 04/05/22 1604        [facility] ([facility]    EXPLORATORY LAPAROTOMY  ABDOMINAL PERINEAL RESECTION  APPENDECTOMY  Omental flap      Procedure Note    Lobito Thakkar  4/5/2022    Pre-op Diagnosis:   Rectal cancer presenting with " necrosis/abscess on imaging between the rectum and the prostate at initial presentation.  Total neoadjuvant therapy in [place].  Presenting at an interval to the emergency room in [place] with urinary retention and subsequent motivated for follow-up back to me.  Tobacco use, unvaccinated for Covid, COPD  Pelvic pain.  MRI T4N0 8.7 cm above the anal verge.   5cm abscess versus necrosis versus tumor between the anterior rectum and prostate.  Preoperative evaluation including MRI and presentation in multidisciplinary tumor board.  Patient refuses consideration of pelvic exenteration /2 stomas,  BMI 30.  No evidence of distant disease on CT.  Reviewed with Dr Rosenberg prior to the patient's office visit after reviewing the MRI and again thereafter.  Care coordinated today- even without patiehnts willingness for pelvic exenteration.  Urolology input / cystoscopy and stents and subsequent evaluation toward the end of the procedure of the urethra following complex dissection/APR.    Post-op Diagnosis:     APR, hard irregular tissue fixed to the prostate at the anterior rectum.  Unresectable for clear margins.  Palliative    Anesthesia: General with Block    Staff:   Circulator: Ruby Lopez RN; Cinthia Nolan RN; Chapincito Shipman, EVANS; Kalli Ceja, EVANS  Scrub Person: Carolina Clark; Josh Roque  Nursing Assistant: Fran Barry PCT; Rosangela Reeder  Assistant: Tracy Watts RNFA    Assistant: Tracy Watts RNFA was responsible for performing the following activities: Irrigation, suction, exposure, approximation of tissue and their skilled assistance was necessary for the success of this case.    Estimated Blood Loss: 200 cc    Specimens: Appendix, rectosigmoid and anal canal        Drains: Pelvic MONICA drain    Findings: As above    Complications: None evident    The procedure was reviewed in the office and reiterated at the bedside.    The patient been marked and counseled by the stomal  ritika Childers Banda to the operating room with antibiotic and DVT prophylaxis.    Block was performed by anesthesia.  Urologic catheters were placed.  There is no gross impingement on the urethra noted at catheter placement.    Midline exploration was undertaken in the lower abdomen and pelvis.    There was mass-effect in the anterior rectum.    The small bowel was run with food content in the small bowel, this was mobile, this did not seem to represent an intraluminal tumor.    There is no evidence of small bowel disease or pathology or strictures.    The hepatobiliary region was without evidence of metastatic disease.    The left colon was mobilized.  The ileocolic region was mobilized to provide exposure to the pelvis.    Both bilateral urologic catheters were appreciated.  Appendectomy was performed with Enseal division of the mesoappendix and CARYL division of the base of the cecum at the appendix.    The left colon was mobilized away from the kidney and the ureter and the rectosigmoid was mobilized.    The distal left colon was divided with CARYL.  The left colon was released from the retroperitoneum/duodenum in anticipation of colostomy.    High ligation of the inferior mesenteric artery was performed by the duodenum.    The dissection continued over the retroperitoneum and presacral plane with preservation of the hypogastric nerves.    The large pelvic mass affect filled the mid to lower pelvis.  Dissection could be achieved posteriorly, posterior laterally, there is no clear plane anteriorly.    Decision was made for APR as opposed to low anterior resection and closure of the anal canal.  Access from the perineum was needed to achieve resection.    Dissection was performed around the anal canal through the puborectalis joining the posterior plane, posterior lateral plane, lateral plane    The distal rectum was released from the anterior tissues without impingement on the urethra.    A plane was developed  through the necrotic tissue in the anterior rectum juxtaposed to the prostatic region with gross defect in the rectal wall.    The resection was complete and the specimen was delivered.    Dr Rosenberg performed evaluation from the perineum and inspected the urethra and bladder.  There was no evidence of significant impingement or traumatic change to the bladder or the urethra with evaluation including cystoscopy.    After irrigation across the pelvics from the abdomen to the perineum with Betadine and then saline, 3 L of fluid were utilized.    The puborectalis was reapproximated with figure-of-eight 0 Vicryl.    10 flat MONICA was placed in the deep pelvis.    Omental flap was developed and delivered in the left lateral/posterior abdomen to the deep pelvis and filled the pelvis.    This should exclude the small bowel.    The colostomy was developed through the rectus at the preoperatively identified site.    The midline was closed with internal retention of 0 Vicryl no 1 loop PDS, the incision was irrigated with dilute antiseptic solution, the skin approximated with skin clips leaving gaps to permit drainage stented open with a Telfa wick.    The colostomy was matured with 3-0 Vicryl.    Digital exam demonstrated patency.    The lap counts were announced as correct.  There was some question with the final counts/change of personnel/documentation of count, the final count was thus determined to be incorrect and imaging is underway with plan for radiology review of complete imaging prior to leaving the OR.      Augustus Breaux MD     Date: 4/5/2022  Time: 16:04 EDT    Electronically signed by Augustus Breaux MD at 04/05/22 7648

## 2022-04-05 NOTE — ANESTHESIA PROCEDURE NOTES
Peripheral Block      Patient reassessed immediately prior to procedure    Patient location during procedure: OR  Start time: 4/5/2022 12:50 PM  Stop time: 4/5/2022 12:56 PM  Reason for block: at surgeon's request and post-op pain management  Performed by  Anesthesiologist: Joy Hinojosa MD  CRNA: Rocio Valle CRNA  Preanesthetic Checklist  Completed: patient identified, IV checked, site marked, risks and benefits discussed, surgical consent, monitors and equipment checked, pre-op evaluation and timeout performed  Prep:  Pt Position: supine  Sterile barriers:cap, gloves, sterile barriers and mask  Prep: ChloraPrep  Patient monitoring: blood pressure monitoring, continuous pulse oximetry and EKG  Procedure    Sedation: yes  Performed under: general  Guidance:ultrasound guided    ULTRASOUND INTERPRETATION. Using ultrasound guidance a 20 G gauge needle was placed in close proximity to the nerve, at which point, under ultrasound guidance anesthetic was injected in the area of the nerve and spread of the anesthesia was seen on ultrasound in close proximity thereto.  There were no abnormalities seen on ultrasound; a digital image was taken; and the patient tolerated the procedure with no complications. Images:still images obtained, printed/placed on chart    Laterality:Bilateral  Block Type:TAP  Injection Technique:single-shot  Needle Type:short-bevel and echogenic  Needle Gauge:20 G  Resistance on Injection: none    Medications Used: dexamethasone sodium phosphate injection, 4 mg  bupivacaine PF (MARCAINE) 0.25 % injection, 60 mL  Med administered at 4/5/2022 12:56 PM      Medications  Comment:Block Injection:  LA dose divided between Right and Left block        Post Assessment  Injection Assessment: negative aspiration for heme, incremental injection and no paresthesia on injection  Patient Tolerance:comfortable throughout block  Complications:no  Additional Notes      Under Ultrasound guidance, a BBraun  4inch 360 degree needle was advanced with Normal Saline hydro dissection of tissue.  The Internal Oblique and Transversus Abdominus muscles where visualized.  At or before the aponeurosis of Internal Oblique, local anesthetic spread was visualized in the Transversus Abdominus Plane. Injection was made incrementally with aspiration every 5 mls.  There was no  intravascular injection,  injection pressure was normal, there was no neural injection, and the procedure was completed without difficulty.  Thank You.

## 2022-04-05 NOTE — ANESTHESIA POSTPROCEDURE EVALUATION
Patient: Alfredo Sandhu    Procedure Summary     Date: 04/05/22 Room / Location:  [facility]  [facility]    Anesthesia Start: 1248 Anesthesia Stop: 1559    Procedure: LOW ANTERIOR RESECTION , APPENDECTOMY, TOTAL MESORECTAL EXCISION, COLOSTOMY, URETERAL CATHETER INSERTION TEMPORARY STENT PLACEMENT (N/A Abdomen) Diagnosis:     Surgeons: Augustus Breaux MD Provider: Farshad Louise MD    Anesthesia Type: general ASA Status: 3          Anesthesia Type: general    Vitals  Vitals Value Taken Time   /82 04/05/22 1604   Temp     Pulse 96 04/05/22 1607   Resp     SpO2 94 % 04/05/22 1607   Vitals shown include unvalidated device data.        Post Anesthesia Care and Evaluation    Patient location during evaluation: PACU  Patient participation: complete - patient participated  Level of consciousness: awake and alert  Pain score: 0  Pain management: adequate  Airway patency: patent  Anesthetic complications: No anesthetic complications  PONV Status: none  Cardiovascular status: hemodynamically stable and acceptable  Respiratory status: nonlabored ventilation, acceptable and nasal cannula  Hydration status: acceptable

## 2022-04-05 NOTE — H&P
Pre-Op H&P  Alfredo Sandhu  [id number]  1960    Chief complaint: rectal bleeding     HPI:    Patient is a 61 y.o.male who presents today for a low anterior resection, total mesorectal resection with creation of permanent colostomy. He initially presented with rectal bleeding and a colonoscopy revealed multiple polyps and extensive rectal cancer. The patient has undergone neoadjuvant chemoradiation. The patient has struggled intermittently with urinary retention secondary to the extent of the tumor. He had a catheter placed at one time but no longer has this in place.     He notes a history of COPD and is a heavy smoker. He was smoking 2 ppd and is now down to half a pack daily. He notes shortness of breath which worsens with exertion. He denies chest pain.     Review of Systems:  General ROS: negative for chills, fever or skin lesions;  No changes since last office visit.  Neg for recent sick exposure  Cardiovascular ROS: no chest pain or dyspnea on exertion  Respiratory ROS: no cough, shortness of breath, or wheezing    Allergies:   Allergies   Allergen Reactions   • Penicillins Hives and Swelling       Home Meds:    No current facility-administered medications on file prior to encounter.     No current outpatient medications on file prior to encounter.       PMH:   Past Medical History:   Diagnosis Date   • Cancer (HCC)     rectal   • COPD (chronic obstructive pulmonary disease) (HCC)    • Hearing aid worn     bilat   • [place] (hard of hearing)     bilat hearing aids   • Wears glasses     readers     PSH:    Past Surgical History:   Procedure Laterality Date   • CATARACT EXTRACTION, BILATERAL Bilateral    • COLONOSCOPY         Immunization History:  Influenza: no  Pneumococcal: yes  Tetanus: yes    Social History:   Tobacco:   Social History     Tobacco Use   Smoking Status Current Every Day Smoker   • Packs/day: 0.50   • Years: 40.00   • Pack years: 20.00   • Types: Cigarettes   Smokeless Tobacco Never Used  "     Alcohol:     Social History     Substance and Sexual Activity   Alcohol Use Yes    Comment: occasional       Vitals:           /81 (BP Location: Right arm, Patient Position: Lying)   Pulse 95   Temp 98.1 °F (36.7 °C) (Temporal)   Resp 18   Ht 172.7 cm (68\")   Wt 90.3 kg (199 lb)   SpO2 94%   BMI 30.26 kg/m²     Physical Exam:  General Appearance:    Alert, cooperative, no distress, appears stated age   Head:    Normocephalic, without obvious abnormality, atraumatic   Lungs:     Breath sounds diminished bilaterally with occasional expiratory wheezes, respirations unlabored    Heart:   Regular rate and rhythm, S1 and S2 normal, no murmur, rub    or gallop    Abdomen:    Soft, nontender.     Breast Exam:    deferred   Genitalia:    deferred   Extremities:   Extremities normal, atraumatic, no cyanosis or edema   Skin:   Skin color, texture, turgor normal, no rashes or lesions   Neurologic:   Grossly intact   Results Review  LABS:  Lab Results   Component Value Date    WBC 8.76 04/04/2022    HGB 14.1 04/04/2022    HCT 43.6 04/04/2022    MCV 98.4 (H) 04/04/2022     04/04/2022    GLUCOSE 127 (H) 04/04/2022    BUN 6 (L) 04/04/2022    CREATININE 0.68 (L) 04/04/2022     04/04/2022    K 4.6 04/04/2022     04/04/2022    CO2 28.0 04/04/2022    CALCIUM 9.8 04/04/2022    ALBUMIN 4.50 04/04/2022    AST 12 04/04/2022    ALT 8 04/04/2022    BILITOT 0.2 04/04/2022       RADIOLOGY:    I reviewed the patient's new clinical results.    Cancer Staging (if applicable)  Cancer Patient: _x_ yes; rectal cancer    Impression:   1. Rectal cancer    Plan:   1. Low anterior resection, total mesorectal resection with creation of permanent colostomy   Patient was seen in the office and risks and benefits were discussed at length. Please see office note for details.     PRAVIN Antony   04/05/22   9:42 AM EDT   "

## 2022-04-06 LAB
ANION GAP SERPL CALCULATED.3IONS-SCNC: 10 MMOL/L (ref 5–15)
BASOPHILS # BLD AUTO: 0.02 10*3/MM3 (ref 0–0.2)
BASOPHILS NFR BLD AUTO: 0.1 % (ref 0–1.5)
BUN SERPL-MCNC: 7 MG/DL (ref 8–23)
BUN/CREAT SERPL: 12.3 (ref 7–25)
CALCIUM SPEC-SCNC: 8.6 MG/DL (ref 8.6–10.5)
CHLORIDE SERPL-SCNC: 101 MMOL/L (ref 98–107)
CO2 SERPL-SCNC: 26 MMOL/L (ref 22–29)
CREAT SERPL-MCNC: 0.57 MG/DL (ref 0.76–1.27)
DEPRECATED RDW RBC AUTO: 48.3 FL (ref 37–54)
EGFRCR SERPLBLD CKD-EPI 2021: 110.2 ML/MIN/1.73
EOSINOPHIL # BLD AUTO: 0 10*3/MM3 (ref 0–0.4)
EOSINOPHIL NFR BLD AUTO: 0 % (ref 0.3–6.2)
ERYTHROCYTE [DISTWIDTH] IN BLOOD BY AUTOMATED COUNT: 12.9 % (ref 12.3–15.4)
GLUCOSE SERPL-MCNC: 148 MG/DL (ref 65–99)
HCT VFR BLD AUTO: 41 % (ref 37.5–51)
HGB BLD-MCNC: 12.7 G/DL (ref 13–17.7)
IMM GRANULOCYTES # BLD AUTO: 0.09 10*3/MM3 (ref 0–0.05)
IMM GRANULOCYTES NFR BLD AUTO: 0.6 % (ref 0–0.5)
LYMPHOCYTES # BLD AUTO: 0.49 10*3/MM3 (ref 0.7–3.1)
LYMPHOCYTES NFR BLD AUTO: 3.1 % (ref 19.6–45.3)
MCH RBC QN AUTO: 31.4 PG (ref 26.6–33)
MCHC RBC AUTO-ENTMCNC: 31 G/DL (ref 31.5–35.7)
MCV RBC AUTO: 101.2 FL (ref 79–97)
MONOCYTES # BLD AUTO: 1.12 10*3/MM3 (ref 0.1–0.9)
MONOCYTES NFR BLD AUTO: 7 % (ref 5–12)
NEUTROPHILS NFR BLD AUTO: 14.32 10*3/MM3 (ref 1.7–7)
NEUTROPHILS NFR BLD AUTO: 89.2 % (ref 42.7–76)
NRBC BLD AUTO-RTO: 0 /100 WBC (ref 0–0.2)
PLATELET # BLD AUTO: 196 10*3/MM3 (ref 140–450)
PMV BLD AUTO: 10.9 FL (ref 6–12)
POTASSIUM SERPL-SCNC: 4.7 MMOL/L (ref 3.5–5.2)
RBC # BLD AUTO: 4.05 10*6/MM3 (ref 4.14–5.8)
SODIUM SERPL-SCNC: 137 MMOL/L (ref 136–145)
WBC NRBC COR # BLD: 16.04 10*3/MM3 (ref 3.4–10.8)

## 2022-04-06 PROCEDURE — 25010000002 KETOROLAC TROMETHAMINE PER 15 MG: Performed by: COLON & RECTAL SURGERY

## 2022-04-06 PROCEDURE — 25010000002 HEPARIN (PORCINE) PER 1000 UNITS: Performed by: COLON & RECTAL SURGERY

## 2022-04-06 PROCEDURE — 80048 BASIC METABOLIC PNL TOTAL CA: CPT | Performed by: COLON & RECTAL SURGERY

## 2022-04-06 PROCEDURE — 25010000002 HYDROMORPHONE PER 4 MG: Performed by: COLON & RECTAL SURGERY

## 2022-04-06 PROCEDURE — 94799 UNLISTED PULMONARY SVC/PX: CPT

## 2022-04-06 PROCEDURE — 25010000002 LEVOFLOXACIN PER 250 MG: Performed by: COLON & RECTAL SURGERY

## 2022-04-06 PROCEDURE — 85025 COMPLETE CBC W/AUTO DIFF WBC: CPT | Performed by: COLON & RECTAL SURGERY

## 2022-04-06 PROCEDURE — 25010000002 SODIUM CHLORIDE 0.9 % WITH KCL 20 MEQ 20-0.9 MEQ/L-% SOLUTION: Performed by: COLON & RECTAL SURGERY

## 2022-04-06 PROCEDURE — 25010000002 HYDROCORTISONE SODIUM SUCCINATE 100 MG RECONSTITUTED SOLUTION: Performed by: INTERNAL MEDICINE

## 2022-04-06 RX ORDER — NICOTINE 21 MG/24HR
1 PATCH, TRANSDERMAL 24 HOURS TRANSDERMAL
Status: DISCONTINUED | OUTPATIENT
Start: 2022-04-06 | End: 2022-04-09 | Stop reason: HOSPADM

## 2022-04-06 RX ORDER — LEVOFLOXACIN 5 MG/ML
500 INJECTION, SOLUTION INTRAVENOUS EVERY 24 HOURS
Status: DISCONTINUED | OUTPATIENT
Start: 2022-04-06 | End: 2022-04-09 | Stop reason: HOSPADM

## 2022-04-06 RX ORDER — METRONIDAZOLE 500 MG/100ML
500 INJECTION, SOLUTION INTRAVENOUS EVERY 8 HOURS
Status: DISCONTINUED | OUTPATIENT
Start: 2022-04-06 | End: 2022-04-09 | Stop reason: HOSPADM

## 2022-04-06 RX ADMIN — Medication 1 PATCH: at 09:27

## 2022-04-06 RX ADMIN — TAMSULOSIN HYDROCHLORIDE 0.4 MG: 0.4 CAPSULE ORAL at 09:27

## 2022-04-06 RX ADMIN — AMLODIPINE BESYLATE 10 MG: 10 TABLET ORAL at 09:27

## 2022-04-06 RX ADMIN — MONTELUKAST 10 MG: 10 TABLET, FILM COATED ORAL at 21:14

## 2022-04-06 RX ADMIN — HYDROCORTISONE SODIUM SUCCINATE 50 MG: 100 INJECTION, POWDER, FOR SOLUTION INTRAMUSCULAR; INTRAVENOUS at 09:28

## 2022-04-06 RX ADMIN — HYDROCORTISONE SODIUM SUCCINATE 50 MG: 100 INJECTION, POWDER, FOR SOLUTION INTRAMUSCULAR; INTRAVENOUS at 14:51

## 2022-04-06 RX ADMIN — FAMOTIDINE 20 MG: 20 TABLET, FILM COATED ORAL at 21:03

## 2022-04-06 RX ADMIN — KETOROLAC TROMETHAMINE 15 MG: 15 INJECTION, SOLUTION INTRAMUSCULAR; INTRAVENOUS at 02:08

## 2022-04-06 RX ADMIN — BUDESONIDE AND FORMOTEROL FUMARATE DIHYDRATE 2 PUFF: 160; 4.5 AEROSOL RESPIRATORY (INHALATION) at 19:42

## 2022-04-06 RX ADMIN — THEOPHYLLINE ANHYDROUS 400 MG: 400 CAPSULE, EXTENDED RELEASE ORAL at 00:00

## 2022-04-06 RX ADMIN — METRONIDAZOLE 500 MG: 500 INJECTION, SOLUTION INTRAVENOUS at 21:04

## 2022-04-06 RX ADMIN — THEOPHYLLINE ANHYDROUS 400 MG: 400 CAPSULE, EXTENDED RELEASE ORAL at 09:36

## 2022-04-06 RX ADMIN — HYDROMORPHONE HYDROCHLORIDE 0.5 MG: 1 INJECTION, SOLUTION INTRAMUSCULAR; INTRAVENOUS; SUBCUTANEOUS at 09:37

## 2022-04-06 RX ADMIN — HEPARIN SODIUM 5000 UNITS: 5000 INJECTION, SOLUTION INTRAVENOUS; SUBCUTANEOUS at 21:03

## 2022-04-06 RX ADMIN — HYDROCORTISONE SODIUM SUCCINATE 50 MG: 100 INJECTION, POWDER, FOR SOLUTION INTRAMUSCULAR; INTRAVENOUS at 22:00

## 2022-04-06 RX ADMIN — HEPARIN SODIUM 5000 UNITS: 5000 INJECTION, SOLUTION INTRAVENOUS; SUBCUTANEOUS at 14:51

## 2022-04-06 RX ADMIN — HYDROCODONE BITARTRATE AND ACETAMINOPHEN 1 TABLET: 7.5; 325 TABLET ORAL at 06:10

## 2022-04-06 RX ADMIN — KETOROLAC TROMETHAMINE 15 MG: 15 INJECTION, SOLUTION INTRAMUSCULAR; INTRAVENOUS at 22:00

## 2022-04-06 RX ADMIN — KETOROLAC TROMETHAMINE 15 MG: 15 INJECTION, SOLUTION INTRAMUSCULAR; INTRAVENOUS at 09:28

## 2022-04-06 RX ADMIN — THEOPHYLLINE ANHYDROUS 400 MG: 400 CAPSULE, EXTENDED RELEASE ORAL at 22:00

## 2022-04-06 RX ADMIN — KETOROLAC TROMETHAMINE 15 MG: 15 INJECTION, SOLUTION INTRAMUSCULAR; INTRAVENOUS at 14:52

## 2022-04-06 RX ADMIN — HYDROCODONE BITARTRATE AND ACETAMINOPHEN 1 TABLET: 7.5; 325 TABLET ORAL at 14:52

## 2022-04-06 RX ADMIN — GABAPENTIN 300 MG: 300 CAPSULE ORAL at 09:27

## 2022-04-06 RX ADMIN — HYDROCODONE BITARTRATE AND ACETAMINOPHEN 1 TABLET: 7.5; 325 TABLET ORAL at 11:03

## 2022-04-06 RX ADMIN — IPRATROPIUM BROMIDE AND ALBUTEROL SULFATE 3 ML: .5; 2.5 SOLUTION RESPIRATORY (INHALATION) at 19:42

## 2022-04-06 RX ADMIN — HEPARIN SODIUM 5000 UNITS: 5000 INJECTION, SOLUTION INTRAVENOUS; SUBCUTANEOUS at 09:27

## 2022-04-06 RX ADMIN — HYDROCODONE BITARTRATE AND ACETAMINOPHEN 1 TABLET: 7.5; 325 TABLET ORAL at 21:02

## 2022-04-06 RX ADMIN — ACETAMINOPHEN 650 MG: 325 TABLET ORAL at 09:27

## 2022-04-06 RX ADMIN — POTASSIUM CHLORIDE AND SODIUM CHLORIDE 100 ML/HR: 900; 150 INJECTION, SOLUTION INTRAVENOUS at 06:10

## 2022-04-06 RX ADMIN — GABAPENTIN 300 MG: 300 CAPSULE ORAL at 21:03

## 2022-04-06 RX ADMIN — LEVOFLOXACIN 500 MG: 500 INJECTION, SOLUTION INTRAVENOUS at 21:06

## 2022-04-06 RX ADMIN — IPRATROPIUM BROMIDE AND ALBUTEROL SULFATE 3 ML: .5; 2.5 SOLUTION RESPIRATORY (INHALATION) at 16:26

## 2022-04-06 RX ADMIN — FAMOTIDINE 20 MG: 20 TABLET, FILM COATED ORAL at 09:27

## 2022-04-06 RX ADMIN — POTASSIUM CHLORIDE AND SODIUM CHLORIDE 100 ML/HR: 900; 150 INJECTION, SOLUTION INTRAVENOUS at 17:17

## 2022-04-06 RX ADMIN — ACETAMINOPHEN 650 MG: 325 TABLET ORAL at 14:52

## 2022-04-06 RX ADMIN — ACETAMINOPHEN 650 MG: 325 TABLET ORAL at 21:14

## 2022-04-06 RX ADMIN — GABAPENTIN 300 MG: 300 CAPSULE ORAL at 17:17

## 2022-04-06 NOTE — PROGRESS NOTES
"IM progress note      Alfredo Sandhu  [id number]  1960     LOS: 1 day     Attending: Augustus Breaux MD    Primary Care Provider: Kenia Galarza APRN      Chief Complaint/Reason for visit:  No chief complaint on file.      Subjective   Feels okay.  Some postoperative pain.  Tolerated clears, no nausea or vomiting.  Baseline shortness of breath.    Objective      Visit Vitals  /79 (BP Location: Right arm, Patient Position: Lying)   Pulse 96   Temp 98.2 °F (36.8 °C) (Oral)   Resp 20   Ht 172.7 cm (68\")   Wt 90.3 kg (199 lb)   SpO2 91%   BMI 30.26 kg/m²     Temp (24hrs), Av.2 °F (36.8 °C), Min:97.4 °F (36.3 °C), Max:99.4 °F (37.4 °C)      Intake/Output:    Intake/Output Summary (Last 24 hours) at 2022 1014  Last data filed at 2022 0826  Gross per 24 hour   Intake 2590 ml   Output 1950 ml   Net 640 ml          Physical Exam:     General Appearance:    Alert, cooperative, in no acute distress   Head:    Normocephalic, without obvious abnormality, atraumatic    Lungs:     Normal effort, symmetric chest rise, bilateral wheezing.  Slightly decreased breath sounds.    Heart:    Regular rhythm and normal rate, normal S1 and S2   Abdomen:    Soft with mild distention.  Dressing intact.  Stoma pink with minimal output.  MONICA drain present.  : Chow present.   Extremities:   No clubbing, cyanosis or edema.  No deformities.    Pulses:   Pulses palpable and equal bilaterally   Skin:   No bleeding, bruising or rash          Results Review:     I reviewed the patient's new clinical results.   Results from last 7 days   Lab Units 22  0558 22  1607   WBC 10*3/mm3 16.04* 8.76   HEMOGLOBIN g/dL 12.7* 14.1   HEMATOCRIT % 41.0 43.6   PLATELETS 10*3/mm3 196 215     Results from last 7 days   Lab Units 22  0558 22  1607   SODIUM mmol/L 137 142   POTASSIUM mmol/L 4.7 4.6   CHLORIDE mmol/L 101 104   CO2 mmol/L 26.0 28.0   BUN mg/dL 7* 6*   CREATININE mg/dL 0.57* 0.68*   CALCIUM mg/dL 8.6 " 9.8   BILIRUBIN mg/dL  --  0.2   ALK PHOS U/L  --  87   ALT (SGPT) U/L  --  8   AST (SGOT) U/L  --  12   GLUCOSE mg/dL 148* 127*     I reviewed the patient's new imaging including images and reports.    All medications reviewed.   Pharmacy Consult, , Does not apply, BID  acetaminophen, 650 mg, Oral, Q8H  amLODIPine, 10 mg, Oral, Daily  budesonide-formoterol, 2 puff, Inhalation, BID - RT  famotidine, 20 mg, Oral, BID  gabapentin, 300 mg, Oral, TID  heparin (porcine), 5,000 Units, Subcutaneous, Q8H  hydrocortisone sodium succinate, 50 mg, Intravenous, Q8H  ipratropium-albuterol, 3 mL, Nebulization, 4x Daily - RT  ketorolac, 15 mg, Intravenous, Q6H  montelukast, 10 mg, Oral, Nightly  nicotine, 1 patch, Transdermal, Q24H  tamsulosin, 0.4 mg, Oral, Daily  theophylline, 400 mg, Oral, Q12H          Assessment/Plan   Status post exploratory laparotomy with LAR, total mesorectal resection, colostomy  Status post cystoscopy, ureteral catheter placement bilaterally    Rectal cancer (HCC)    COPD (chronic obstructive pulmonary disease) (HCC)    Tobacco abuse    HTN (hypertension)    Rectal bleeding      Plan   1. Ambulation, several times daily  2. Pain control-prns       3. IS-encourage  4. DVT proph- Mechanical, subcutaneous heparin  5. Bowel regimen, alvimopan  6. Resume home medications as appropriate  7. Monitor post-op labs  8. DC planning   9. Diet, Clears, advance diet as tolerated.  IVF initially, monitor volume status.     - Hypertension:  Resumed home medications as appropriate, formulary substitution when indicated.  Holding parameters.  Prn medications for elevated blood pressure.     -COPD, ongoing tobacco abuse:  We will schedule bronchodilators, monitor oxygen status, encourage incentive spirometer.  Nicotine patch if desired.     -New stoma:  Wound care stoma nurse consult for stoma care and education throughout patient's hospitalization.     -Chow catheter management postop, voiding trial at the timing  dictated by urology and colorectal surgery.  Resume Flomax     Highly complex case       Dragon disclaimer:  Part of this encounter note is an electronic transcription/translation of spoken language to printed text. The electronic translation of spoken language may permit erroneous, or at times, nonsensical words or phrases to be inadvertently transcribed; Although I have reviewed the note for such errors, some may still exist.    Joe Chery MD  04/06/22  10:14 EDT

## 2022-04-06 NOTE — PLAN OF CARE
Goal Outcome Evaluation:  Plan of Care Reviewed With: patient        Progress: improving  Outcome Evaluation: ambulating in hallway with sb assistance, tolerating clear liquid diet, MONICA drain site leaking, site reinforced, colostomy with scant output, AAOx4, c/o pain prn meds given

## 2022-04-06 NOTE — NURSING NOTE
"Arrived by stretcher, ambulated to the bed with assist of 3 staff--very drowsy and leaning over with unsteady, weak, sedated gait.  Family at bedside. Pt assisted into bed. Chow with blood tinged urine and large MONICA with sanguinous drainage. Dozing frequently. Oxygen per NS 2-3 L applied--adjusted upwards as saturations were 87% on 2 L--mouth breathing and intermittently removing oxygen stating, \"I don't need that\". SCD's powered on, Incentive spirometer placed in front of pt. Oriented to floor, unit, POC, remote, \"Call, don't Fall\". Medications at bedside obtained and to be given to pharmacy for securing. Dressings intact to Abd--small amount of sanguinous drainage in colostomy bag--stoma beefy red.   "

## 2022-04-06 NOTE — PLAN OF CARE
Goal Outcome Evaluation:  Plan of Care Reviewed With: patient, spouse, daughter        Progress: no change

## 2022-04-06 NOTE — PROGRESS NOTES
Satisfactory early postoperative course after palliative resection and creation of colostomy.    Findings from surgery reviewed    Abdomen is soft  Significant gas from stoma already  MONICA output appears reasonable.    Findings of surgery discussed.  Goals of frequent ambulation and importance of not forcing diet were reviewed  Patient looks forward to recuperation and transition to the home setting.  Interval lab work to trend white count.

## 2022-04-06 NOTE — CASE MANAGEMENT/SOCIAL WORK
Discharge Planning Assessment  [facility]     Patient Name: Alfredo Sandhu  MRN: [id number]  Today's Date: 4/6/2022    Admit Date: 4/5/2022     Discharge Needs Assessment     Row Name 04/06/22 1740       Living Environment    People in Home spouse    Name(s) of People in Home Alyssa Sandhu - wife    Current Living Arrangements home    Primary Care Provided by self    Provides Primary Care For no one    Family Caregiver if Needed spouse    Family Caregiver Names Wife -Opal michel    Quality of Family Relationships helpful;involved;supportive    Able to Return to Prior Arrangements yes       Transition Planning    Patient/Family Anticipates Transition to home with family    Transportation Anticipated family or friend will provide       Discharge Needs Assessment    Readmission Within the Last 30 Days no previous admission in last 30 days    Equipment Currently Used at Home nebulizer;oxygen    Concerns to be Addressed discharge planning    Current Discharge Risk chronically ill               Discharge Plan     Row Name 04/06/22 1742       Plan    Plan Home with Family    Patient/Family in Agreement with Plan yes    Plan Comments I have met with Mr. Sandhu at his bedside today to initiate a discharge plan.  He states that he lives with his wife in [facility].  He reports that he is independent with activities of daily living and mobility.  He states that he has home oxygen but that he does not use it.  He also has a nebulizer machine.  He denies current receipt of home health/outpatient services.  Mr. Sandhu plans to return home at discharge and states that his son-in-law will provide transportation when needed.  He anticipates hasving no discharge needs.  CM will cont to follow the plan of care and assist with discharge planning as recommendations become available.    Final Discharge Disposition Code 01 - home or self-care              Continued Care and Services - Admitted Since 4/5/2022    Coordination has not been  started for this encounter.          Demographic Summary     Row Name 04/06/22 2191       General Information    General Information Comments I have confirmed with Mr. Sandhu his PCP is Kenia MANZO and his insurance is [employer] Medicare.               Functional Status     Row Name 04/06/22 2768       Functional Status, IADL    Medications independent    Meal Preparation independent    Housekeeping independent    Laundry independent    Shopping independent               Psychosocial    No documentation.                Abuse/Neglect    No documentation.                Legal    No documentation.                Substance Abuse    No documentation.                Patient Forms    No documentation.                   Kristi Barrera RN

## 2022-04-07 ENCOUNTER — APPOINTMENT (OUTPATIENT)
Dept: GENERAL RADIOLOGY | Facility: HOSPITAL | Age: 62
End: 2022-04-07

## 2022-04-07 LAB
ALBUMIN SERPL-MCNC: 3.3 G/DL (ref 3.5–5.2)
ALBUMIN/GLOB SERPL: 1.1 G/DL
ALP SERPL-CCNC: 73 U/L (ref 39–117)
ALT SERPL W P-5'-P-CCNC: 8 U/L (ref 1–41)
ANION GAP SERPL CALCULATED.3IONS-SCNC: 7 MMOL/L (ref 5–15)
AST SERPL-CCNC: 17 U/L (ref 1–40)
BASOPHILS # BLD AUTO: 0.02 10*3/MM3 (ref 0–0.2)
BASOPHILS NFR BLD AUTO: 0.1 % (ref 0–1.5)
BILIRUB SERPL-MCNC: 0.2 MG/DL (ref 0–1.2)
BUN SERPL-MCNC: 8 MG/DL (ref 8–23)
BUN/CREAT SERPL: 13.8 (ref 7–25)
CALCIUM SPEC-SCNC: 8.8 MG/DL (ref 8.6–10.5)
CHLORIDE SERPL-SCNC: 104 MMOL/L (ref 98–107)
CO2 SERPL-SCNC: 28 MMOL/L (ref 22–29)
CREAT SERPL-MCNC: 0.58 MG/DL (ref 0.76–1.27)
DEPRECATED RDW RBC AUTO: 49.1 FL (ref 37–54)
EGFRCR SERPLBLD CKD-EPI 2021: 109.6 ML/MIN/1.73
EOSINOPHIL # BLD AUTO: 0.01 10*3/MM3 (ref 0–0.4)
EOSINOPHIL NFR BLD AUTO: 0.1 % (ref 0.3–6.2)
ERYTHROCYTE [DISTWIDTH] IN BLOOD BY AUTOMATED COUNT: 13.1 % (ref 12.3–15.4)
GLOBULIN UR ELPH-MCNC: 3 GM/DL
GLUCOSE SERPL-MCNC: 128 MG/DL (ref 65–99)
HCT VFR BLD AUTO: 37.1 % (ref 37.5–51)
HGB BLD-MCNC: 11.6 G/DL (ref 13–17.7)
IMM GRANULOCYTES # BLD AUTO: 0.14 10*3/MM3 (ref 0–0.05)
IMM GRANULOCYTES NFR BLD AUTO: 1 % (ref 0–0.5)
LYMPHOCYTES # BLD AUTO: 0.5 10*3/MM3 (ref 0.7–3.1)
LYMPHOCYTES NFR BLD AUTO: 3.5 % (ref 19.6–45.3)
MCH RBC QN AUTO: 31.9 PG (ref 26.6–33)
MCHC RBC AUTO-ENTMCNC: 31.3 G/DL (ref 31.5–35.7)
MCV RBC AUTO: 101.9 FL (ref 79–97)
MONOCYTES # BLD AUTO: 1.01 10*3/MM3 (ref 0.1–0.9)
MONOCYTES NFR BLD AUTO: 7.1 % (ref 5–12)
NEUTROPHILS NFR BLD AUTO: 12.46 10*3/MM3 (ref 1.7–7)
NEUTROPHILS NFR BLD AUTO: 88.2 % (ref 42.7–76)
NRBC BLD AUTO-RTO: 0 /100 WBC (ref 0–0.2)
PLATELET # BLD AUTO: 175 10*3/MM3 (ref 140–450)
PMV BLD AUTO: 10.9 FL (ref 6–12)
POTASSIUM SERPL-SCNC: 4.3 MMOL/L (ref 3.5–5.2)
PROT SERPL-MCNC: 6.3 G/DL (ref 6–8.5)
RBC # BLD AUTO: 3.64 10*6/MM3 (ref 4.14–5.8)
SODIUM SERPL-SCNC: 139 MMOL/L (ref 136–145)
WBC NRBC COR # BLD: 14.14 10*3/MM3 (ref 3.4–10.8)

## 2022-04-07 PROCEDURE — 85025 COMPLETE CBC W/AUTO DIFF WBC: CPT | Performed by: COLON & RECTAL SURGERY

## 2022-04-07 PROCEDURE — 97161 PT EVAL LOW COMPLEX 20 MIN: CPT

## 2022-04-07 PROCEDURE — 80053 COMPREHEN METABOLIC PANEL: CPT | Performed by: COLON & RECTAL SURGERY

## 2022-04-07 PROCEDURE — 25010000002 KETOROLAC TROMETHAMINE PER 15 MG: Performed by: COLON & RECTAL SURGERY

## 2022-04-07 PROCEDURE — 25010000002 ONDANSETRON PER 1 MG: Performed by: COLON & RECTAL SURGERY

## 2022-04-07 PROCEDURE — 94799 UNLISTED PULMONARY SVC/PX: CPT

## 2022-04-07 PROCEDURE — 71046 X-RAY EXAM CHEST 2 VIEWS: CPT

## 2022-04-07 PROCEDURE — 63710000001 PREDNISONE PER 5 MG: Performed by: INTERNAL MEDICINE

## 2022-04-07 PROCEDURE — 25010000002 HEPARIN (PORCINE) PER 1000 UNITS: Performed by: COLON & RECTAL SURGERY

## 2022-04-07 PROCEDURE — 25010000002 SODIUM CHLORIDE 0.9 % WITH KCL 20 MEQ 20-0.9 MEQ/L-% SOLUTION: Performed by: COLON & RECTAL SURGERY

## 2022-04-07 PROCEDURE — 25010000002 LEVOFLOXACIN PER 250 MG: Performed by: COLON & RECTAL SURGERY

## 2022-04-07 PROCEDURE — 25010000002 HYDROMORPHONE PER 4 MG: Performed by: COLON & RECTAL SURGERY

## 2022-04-07 RX ORDER — PREDNISONE 10 MG/1
10 TABLET ORAL DAILY
Status: DISCONTINUED | OUTPATIENT
Start: 2022-04-07 | End: 2022-04-09 | Stop reason: HOSPADM

## 2022-04-07 RX ADMIN — PREDNISONE 10 MG: 10 TABLET ORAL at 08:04

## 2022-04-07 RX ADMIN — METRONIDAZOLE 500 MG: 500 INJECTION, SOLUTION INTRAVENOUS at 23:05

## 2022-04-07 RX ADMIN — Medication: at 22:53

## 2022-04-07 RX ADMIN — THEOPHYLLINE ANHYDROUS 400 MG: 400 CAPSULE, EXTENDED RELEASE ORAL at 08:06

## 2022-04-07 RX ADMIN — ACETAMINOPHEN 650 MG: 325 TABLET ORAL at 23:03

## 2022-04-07 RX ADMIN — IPRATROPIUM BROMIDE AND ALBUTEROL SULFATE 3 ML: .5; 2.5 SOLUTION RESPIRATORY (INHALATION) at 19:44

## 2022-04-07 RX ADMIN — HEPARIN SODIUM 5000 UNITS: 5000 INJECTION, SOLUTION INTRAVENOUS; SUBCUTANEOUS at 06:28

## 2022-04-07 RX ADMIN — LEVOFLOXACIN 500 MG: 500 INJECTION, SOLUTION INTRAVENOUS at 23:05

## 2022-04-07 RX ADMIN — TAMSULOSIN HYDROCHLORIDE 0.4 MG: 0.4 CAPSULE ORAL at 08:03

## 2022-04-07 RX ADMIN — HYDROMORPHONE HYDROCHLORIDE 0.5 MG: 1 INJECTION, SOLUTION INTRAMUSCULAR; INTRAVENOUS; SUBCUTANEOUS at 02:39

## 2022-04-07 RX ADMIN — MONTELUKAST 10 MG: 10 TABLET, FILM COATED ORAL at 23:02

## 2022-04-07 RX ADMIN — ONDANSETRON 4 MG: 2 INJECTION INTRAMUSCULAR; INTRAVENOUS at 15:01

## 2022-04-07 RX ADMIN — Medication: at 08:06

## 2022-04-07 RX ADMIN — HYDROCODONE BITARTRATE AND ACETAMINOPHEN 1 TABLET: 7.5; 325 TABLET ORAL at 19:08

## 2022-04-07 RX ADMIN — HEPARIN SODIUM 5000 UNITS: 5000 INJECTION, SOLUTION INTRAVENOUS; SUBCUTANEOUS at 13:42

## 2022-04-07 RX ADMIN — KETOROLAC TROMETHAMINE 15 MG: 15 INJECTION, SOLUTION INTRAMUSCULAR; INTRAVENOUS at 13:43

## 2022-04-07 RX ADMIN — KETOROLAC TROMETHAMINE 15 MG: 15 INJECTION, SOLUTION INTRAMUSCULAR; INTRAVENOUS at 08:03

## 2022-04-07 RX ADMIN — HYDROCODONE BITARTRATE AND ACETAMINOPHEN 1 TABLET: 7.5; 325 TABLET ORAL at 23:04

## 2022-04-07 RX ADMIN — THEOPHYLLINE ANHYDROUS 400 MG: 400 CAPSULE, EXTENDED RELEASE ORAL at 23:06

## 2022-04-07 RX ADMIN — GABAPENTIN 300 MG: 300 CAPSULE ORAL at 08:04

## 2022-04-07 RX ADMIN — BUDESONIDE AND FORMOTEROL FUMARATE DIHYDRATE 2 PUFF: 160; 4.5 AEROSOL RESPIRATORY (INHALATION) at 07:42

## 2022-04-07 RX ADMIN — GABAPENTIN 300 MG: 300 CAPSULE ORAL at 23:02

## 2022-04-07 RX ADMIN — METRONIDAZOLE 500 MG: 500 INJECTION, SOLUTION INTRAVENOUS at 12:07

## 2022-04-07 RX ADMIN — GABAPENTIN 300 MG: 300 CAPSULE ORAL at 17:28

## 2022-04-07 RX ADMIN — IPRATROPIUM BROMIDE AND ALBUTEROL SULFATE 3 ML: .5; 2.5 SOLUTION RESPIRATORY (INHALATION) at 16:01

## 2022-04-07 RX ADMIN — METRONIDAZOLE 500 MG: 500 INJECTION, SOLUTION INTRAVENOUS at 03:55

## 2022-04-07 RX ADMIN — FAMOTIDINE 20 MG: 20 TABLET, FILM COATED ORAL at 08:04

## 2022-04-07 RX ADMIN — KETOROLAC TROMETHAMINE 15 MG: 15 INJECTION, SOLUTION INTRAMUSCULAR; INTRAVENOUS at 03:48

## 2022-04-07 RX ADMIN — ACETAMINOPHEN 650 MG: 325 TABLET ORAL at 13:43

## 2022-04-07 RX ADMIN — POTASSIUM CHLORIDE AND SODIUM CHLORIDE 100 ML/HR: 900; 150 INJECTION, SOLUTION INTRAVENOUS at 02:41

## 2022-04-07 RX ADMIN — Medication 1 PATCH: at 08:00

## 2022-04-07 RX ADMIN — ACETAMINOPHEN 650 MG: 325 TABLET ORAL at 06:28

## 2022-04-07 RX ADMIN — HYDROMORPHONE HYDROCHLORIDE 0.5 MG: 1 INJECTION, SOLUTION INTRAMUSCULAR; INTRAVENOUS; SUBCUTANEOUS at 20:35

## 2022-04-07 RX ADMIN — HEPARIN SODIUM 5000 UNITS: 5000 INJECTION, SOLUTION INTRAVENOUS; SUBCUTANEOUS at 23:02

## 2022-04-07 RX ADMIN — IPRATROPIUM BROMIDE AND ALBUTEROL SULFATE 3 ML: .5; 2.5 SOLUTION RESPIRATORY (INHALATION) at 07:42

## 2022-04-07 RX ADMIN — BUDESONIDE AND FORMOTEROL FUMARATE DIHYDRATE 2 PUFF: 160; 4.5 AEROSOL RESPIRATORY (INHALATION) at 19:41

## 2022-04-07 RX ADMIN — FAMOTIDINE 20 MG: 20 TABLET, FILM COATED ORAL at 23:01

## 2022-04-07 RX ADMIN — HYDROCODONE BITARTRATE AND ACETAMINOPHEN 1 TABLET: 7.5; 325 TABLET ORAL at 02:39

## 2022-04-07 RX ADMIN — AMLODIPINE BESYLATE 10 MG: 10 TABLET ORAL at 08:04

## 2022-04-07 NOTE — PROGRESS NOTES
"IM progress note      Alfredo Sandhu  [id number]  1960     LOS: 2 days     Attending: Augustus Breaux MD    Primary Care Provider: Kenia Galarza APRN      Chief Complaint/Reason for visit:  No chief complaint on file.      Subjective   Feels okay.  Some postoperative pain.  Tolerated full liquis, no nausea or vomiting. Stoma with output. No f/c.      Objective      Visit Vitals  /88 (BP Location: Left arm, Patient Position: Lying)   Pulse 93   Temp 98.1 °F (36.7 °C) (Oral)   Resp 18   Ht 172.7 cm (68\")   Wt 90.3 kg (199 lb)   SpO2 96%   BMI 30.26 kg/m²     Temp (24hrs), Av.4 °F (36.9 °C), Min:98.1 °F (36.7 °C), Max:98.7 °F (37.1 °C)      Intake/Output:    Intake/Output Summary (Last 24 hours) at 2022 1014  Last data filed at 2022 0500  Gross per 24 hour   Intake 2614 ml   Output 4155 ml   Net -1541 ml          Physical Exam:     General Appearance:    Alert, cooperative, in no acute distress   Head:    Normocephalic, without obvious abnormality, atraumatic    Lungs:     Normal effort, symmetric chest rise, bilateral wheezing.  Slightly decreased breath sounds.    Heart:    Regular rhythm and normal rate, normal S1 and S2   Abdomen:    Soft with mild distention.  Dressing intact.  Stoma pink with output .  MONICA drain present.  : Chow present.   Extremities:   No clubbing, cyanosis or edema.  No deformities.    Pulses:   Pulses palpable and equal bilaterally   Skin:   No bleeding, bruising or rash          Results Review:     I reviewed the patient's new clinical results.   Results from last 7 days   Lab Units 22  0601 22  0558 22  1607   WBC 10*3/mm3 14.14* 16.04* 8.76   HEMOGLOBIN g/dL 11.6* 12.7* 14.1   HEMATOCRIT % 37.1* 41.0 43.6   PLATELETS 10*3/mm3 175 196 215     Results from last 7 days   Lab Units 22  0601 22  0558 22  1607   SODIUM mmol/L 139 137 142   POTASSIUM mmol/L 4.3 4.7 4.6   CHLORIDE mmol/L 104 101 104   CO2 mmol/L 28.0 26.0 28.0 "   BUN mg/dL 8 7* 6*   CREATININE mg/dL 0.58* 0.57* 0.68*   CALCIUM mg/dL 8.8 8.6 9.8   BILIRUBIN mg/dL 0.2  --  0.2   ALK PHOS U/L 73  --  87   ALT (SGPT) U/L 8  --  8   AST (SGOT) U/L 17  --  12   GLUCOSE mg/dL 128* 148* 127*     I reviewed the patient's new imaging including images and reports.    All medications reviewed.   Pharmacy Consult, , Does not apply, BID  acetaminophen, 650 mg, Oral, Q8H  amLODIPine, 10 mg, Oral, Daily  budesonide-formoterol, 2 puff, Inhalation, BID - RT  famotidine, 20 mg, Oral, BID  gabapentin, 300 mg, Oral, TID  heparin (porcine), 5,000 Units, Subcutaneous, Q8H  ipratropium-albuterol, 3 mL, Nebulization, 4x Daily - RT  ketorolac, 15 mg, Intravenous, Q6H  levoFLOXacin, 500 mg, Intravenous, Q24H  metroNIDAZOLE, 500 mg, Intravenous, Q8H  montelukast, 10 mg, Oral, Nightly  nicotine, 1 patch, Transdermal, Q24H  predniSONE, 10 mg, Oral, Daily  tamsulosin, 0.4 mg, Oral, Daily  theophylline, 400 mg, Oral, Q12H          Assessment/Plan   Status post exploratory laparotomy with LAR, total mesorectal resection, colostomy  Status post cystoscopy, ureteral catheter placement bilaterally    Rectal cancer (HCC)    COPD (chronic obstructive pulmonary disease) (HCC)    Tobacco abuse    HTN (hypertension)    Rectal bleeding    Postop leukocytosis, in part reactive and steroid induced.     Plan   1. Ambulation, several times daily  2. Pain control-prns       3. IS-encourage  4. DVT proph- Mechanical, subcutaneous heparin  5. Bowel regimen, alvimopan  6. Resume home medications as appropriate  7. Monitor post-op labs, improving Leukocytosis.  8. DC planning   9. Diet, Clears, advance diet as tolerated.  IVF initially, monitor volume status.     - Hypertension:  Resumed home medications as appropriate, formulary substitution when indicated.  Holding parameters.  Prn medications for elevated blood pressure.     -COPD, ongoing tobacco abuse:  We will schedule bronchodilators, monitor oxygen status,  encourage incentive spirometer. Back on daily prednisone after stress dosing.   Nicotine patch if desired.     -New stoma:  Wound care stoma nurse consult for stoma care and education throughout patient's hospitalization.     -Chow catheter , voiding trial today, after discussion with .     Highly complex case       Dragon disclaimer:  Part of this encounter note is an electronic transcription/translation of spoken language to printed text. The electronic translation of spoken language may permit erroneous, or at times, nonsensical words or phrases to be inadvertently transcribed; Although I have reviewed the note for such errors, some may still exist.    Joe Chery MD  04/07/22  10:14 EDT

## 2022-04-07 NOTE — PROGRESS NOTES
"[facility] ([facility])        Feels well, frequent ambulation, tolerating diet, good colostomy function, anxious and looking forward to going home.  We have removed the Chow earlier, no voiding yet.    Vital Signs:  Blood pressure 142/86, pulse 93, temperature 98.4 °F (36.9 °C), temperature source Oral, resp. rate 18, height 172.7 cm (68\"), weight 90.3 kg (199 lb), SpO2 96 %.    Labs past 24 hours:  Lab Results (last 24 hours)     Procedure Component Value Units Date/Time    Comprehensive Metabolic Panel [[id number]]  (Abnormal) Collected: 04/07/22 0601    Specimen: Blood Updated: 04/07/22 0705     Glucose 128 mg/dL      BUN 8 mg/dL      Creatinine 0.58 mg/dL      Sodium 139 mmol/L      Potassium 4.3 mmol/L      Chloride 104 mmol/L      CO2 28.0 mmol/L      Calcium 8.8 mg/dL      Total Protein 6.3 g/dL      Albumin 3.30 g/dL      ALT (SGPT) 8 U/L      AST (SGOT) 17 U/L      Alkaline Phosphatase 73 U/L      Total Bilirubin 0.2 mg/dL      Globulin 3.0 gm/dL      Comment: Calculated Result        A/G Ratio 1.1 g/dL      BUN/Creatinine Ratio 13.8     Anion Gap 7.0 mmol/L      eGFR 109.6 mL/min/1.73      Comment: National Kidney Foundation and American Society of Nephrology (ASN) Task Force recommended calculation based on the Chronic Kidney Disease Epidemiology Collaboration (CKD-EPI) equation refit without adjustment for race.       Narrative:      GFR Normal >60  Chronic Kidney Disease <60  Kidney Failure <15      CBC & Differential [[id number]]  (Abnormal) Collected: 04/07/22 0601    Specimen: Blood Updated: 04/07/22 0626    Narrative:      The following orders were created for panel order CBC & Differential.  Procedure                               Abnormality         Status                     ---------                               -----------         ------                     CBC Auto Differential[[id number]]        Abnormal            Final result                 Please view " results for these tests on the individual orders.    CBC Auto Differential [[id number]]  (Abnormal) Collected: 04/07/22 0601    Specimen: Blood Updated: 04/07/22 0626     WBC 14.14 10*3/mm3      RBC 3.64 10*6/mm3      Hemoglobin 11.6 g/dL      Hematocrit 37.1 %      .9 fL      MCH 31.9 pg      MCHC 31.3 g/dL      RDW 13.1 %      RDW-SD 49.1 fl      MPV 10.9 fL      Platelets 175 10*3/mm3      Neutrophil % 88.2 %      Lymphocyte % 3.5 %      Monocyte % 7.1 %      Eosinophil % 0.1 %      Basophil % 0.1 %      Immature Grans % 1.0 %      Neutrophils, Absolute 12.46 10*3/mm3      Lymphocytes, Absolute 0.50 10*3/mm3      Monocytes, Absolute 1.01 10*3/mm3      Eosinophils, Absolute 0.01 10*3/mm3      Basophils, Absolute 0.02 10*3/mm3      Immature Grans, Absolute 0.14 10*3/mm3      nRBC 0.0 /100 WBC           I/O last shift:  I/O this shift:  In: -   Out: 705 [Urine:700; Stool:5]     PHYSICAL EXAM-  Comfortable  cv- rsr  Abd- soft, mild distention, good colostomy function  Haresh removed from anterior abdominal incision  Penrose removed from perineum.    Pathology:  Order Name Source Comment Collection Info Order Time   ABO/RH SPECIMEN VERIFICATION PREOP   Collected By: Heavenly Cisneros 4/5/2022  9:12 AM     Release to patient   Immediate        TISSUE PATHOLOGY EXAM Large Intestine, Appendix  Collected By: Augustus Breaux MD 4/5/2022  1:51 PM     Release to patient   Immediate        .    Assessment and Plan:  Diet as tolerated  Once voiding, anticipate transition to home.    Augustus Breaux MD  04/07/22  15:04 EDT

## 2022-04-07 NOTE — PLAN OF CARE
Goal Outcome Evaluation:  Plan of Care Reviewed With: patient        Progress: improving  Outcome Evaluation: PT eval performed: Patient able to demonstrate good mobility.  Indep with transfers.  Pt able to ambulate 230', indep with good balance and safety awareness.  Pt able to push IV pole.  Recommend d/c home with assist, no further therapy services warranted

## 2022-04-07 NOTE — NURSING NOTE
Woc follow up for ostomy education    Surgery date: 04/05/2022    Ostomy type: Descending colostomy    Appliance in place: [place] 8331 placed in surgery    Last pouch change: N/A    Stoma Assessment: Stoma is red round swollen with a little bit of dried blood around the incision sutures at the MC junction.  There is thin brown liquid about 50 mL in the bottom of pouch that was drained and patient was taught how to empty and draining close pouch.    Education performed: Lots of education provided starting from how to take off the pouch how to clean the skin and how to apply new pouch.  Timeline of shrinkage of stoma and how often long patient will need to measure and cut around stoma.  After that ordering precut pouches.  Catalog briefly reviewed, resource folder briefly reviewed, expectations of the next few ostomy visits reviewed.  Plan is for patient to measure and cut practice pouch tomorrow and then change her own pouch the next day before going home.    Woc will continue to follow.     Please contact with questions or concerns.

## 2022-04-07 NOTE — PLAN OF CARE
Goal Outcome Evaluation:  Plan of Care Reviewed With: patient        Progress: improving  Outcome Evaluation: PT eval performed: Patient able to demonstrate good mobility.  Indep with transfers.  Pt able to ambulate 230', indep with good balance and safety awareness.  Pt able to push IV pole.  Recommend d/c home with assist, no further therapy services warranted   patient

## 2022-04-07 NOTE — THERAPY EVALUATION
Patient Name: Alfredo Sandhu  : 1960    MRN: [id number]                              Today's Date: 2022       Admit Date: 2022    Visit Dx:     ICD-10-CM ICD-9-CM   1. Rectal cancer (HCC)  C20 154.1     Patient Active Problem List   Diagnosis   • COPD (chronic obstructive pulmonary disease) (HCC)   • Tobacco abuse   • HTN (hypertension)   • Rectal cancer (HCC)   • Rectal bleeding     Past Medical History:   Diagnosis Date   • Cancer (HCC)     rectal   • COPD (chronic obstructive pulmonary disease) (HCC)    • Hearing aid worn     bilat   • [place] (hard of hearing)     bilat hearing aids   • Wears glasses     readers     Past Surgical History:   Procedure Laterality Date   • CATARACT EXTRACTION, BILATERAL Bilateral    • COLON RESECTION N/A 2022    Procedure: LOW ANTERIOR RESECTION , APPENDECTOMY, TOTAL MESORECTAL EXCISION, COLOSTOMY, URETERAL CATHETER INSERTION TEMPORARY STENT PLACEMENT;  Surgeon: Augustus Breaux MD;  Location: [facility];  Service: General;  Laterality: N/A;   • COLONOSCOPY        General Information     Row Name 22 1101          Physical Therapy Time and Intention    Document Type evaluation  -KG     Mode of Treatment physical therapy  -KG     Row Name 22 1101          General Information    Patient Profile Reviewed yes  -KG     Prior Level of Function independent:;all household mobility;ADL's  -KG     Existing Precautions/Restrictions other (see comments)  MONICA drain, ostomy, quinteros catheter  -KG     Barriers to Rehab none identified  -KG     Row Name 22 1101          Living Environment    People in Home spouse  -KG     Row Name 22 1101          Home Main Entrance    Number of Stairs, Main Entrance two  -KG     Stair Railings, Main Entrance railings on both sides of stairs  -KG     Row Name 22 1101          Stairs Within Home, Primary    Number of Stairs, Within Home, Primary none  -KG     Row Name 22 1101          Cognition    Orientation Status  (Cognition) oriented x 4  -KG           User Key  (r) = Recorded By, (t) = Taken By, (c) = Cosigned By    Initials Name Provider Type    Cecy Sampson Physical Therapist               Mobility     Row Name 04/07/22 1102          Bed Mobility    Comment, (Bed Mobility) UIC  -KG     Row Name 04/07/22 1102          Transfers    Comment, (Transfers) cues safe HP  -KG     Row Name 04/07/22 1102          Sit-Stand Transfer    Sit-Stand [place] (Transfers) independent  -KG     Row Name 04/07/22 1102          Gait/Stairs (Locomotion)    [place] Level (Gait) independent  -KG     Assistive Device (Gait) other (see comments)  IV pole  -KG     Distance in Feet (Gait) 230  -KG     Comment, (Gait/Stairs) Pt able to ambulate 230', indep with good balance and safety awareness.  Pt able to push IV pole  -KG           User Key  (r) = Recorded By, (t) = Taken By, (c) = Cosigned By    Initials Name Provider Type    Cecy Sampson Physical Therapist               Obj/Interventions     Row Name 04/07/22 1108          Strength Comprehensive (MMT)    General Manual Muscle Testing (MMT) Assessment no strength deficits identified  -KG     Row Name 04/07/22 1108          Balance    Balance Interventions standing;sit to stand;weight shifting activity  -KG           User Key  (r) = Recorded By, (t) = Taken By, (c) = Cosigned By    Initials Name Provider Type    Cecy Sampson Physical Therapist               Goals/Plan    No documentation.                Clinical Impression     Row Name 04/07/22 1108          Pain    Posttreatment Pain Rating 5/10  -KG     Pain Location incisional  -KG     Pain Location - abdomen  -KG     Pain Intervention(s) Repositioned;Ambulation/increased activity  -KG     Row Name 04/07/22 1108          Plan of Care Review    Plan of Care Reviewed With patient  -KG     Progress improving  -KG     Outcome Evaluation PT eval performed: Patient able to demonstrate good mobility.  Indep with  transfers.  Pt able to ambulate 230', indep with good balance and safety awareness.  Pt able to push IV pole.  Recommend d/c home with assist, no further therapy services warranted  -KG     Row Name 04/07/22 1108          Vital Signs    Post Systolic BP Rehab 181  -KG     Post Treatment Diastolic BP 84  -KG     Row Name 04/07/22 1108          Positioning and Restraints    Pre-Treatment Position sitting in chair/recliner  -KG     Post Treatment Position chair  -KG     In Chair notified nsg;with nsg;call light within reach;encouraged to call for assist  -KG           User Key  (r) = Recorded By, (t) = Taken By, (c) = Cosigned By    Initials Name Provider Type    Cecy Sampson Physical Therapist               Outcome Measures     Row Name 04/07/22 1110          How much help from another person do you currently need...    Turning from your back to your side while in flat bed without using bedrails? 4  -KG     Moving from lying on back to sitting on the side of a flat bed without bedrails? 4  -KG     Moving to and from a bed to a chair (including a wheelchair)? 4  -KG     Standing up from a chair using your arms (e.g., wheelchair, bedside chair)? 4  -KG     Climbing 3-5 steps with a railing? 4  -KG     To walk in hospital room? 4  -KG     AM-PAC 6 Clicks Score (PT) 24  -KG     Row Name 04/07/22 1110          Functional Assessment    Outcome Measure Options AM-PAC 6 Clicks Basic Mobility (PT)  -KG           User Key  (r) = Recorded By, (t) = Taken By, (c) = Cosigned By    Initials Name Provider Type    Cecy Sampson Physical Therapist                               PT Recommendation and Plan     Plan of Care Reviewed With: patient  Progress: improving  Outcome Evaluation: PT eval performed: Patient able to demonstrate good mobility.  Indep with transfers.  Pt able to ambulate 230', indep with good balance and safety awareness.  Pt able to push IV pole.  Recommend d/c home with assist, no further therapy  services warranted     Time Calculation:    PT Charges     Row Name 04/07/22 1111             Time Calculation    Start Time 1022  -KG      PT Received On 04/07/22  -KG              Untimed Charges    PT Eval/Re-eval Minutes 53  -KG              Total Minutes    Untimed Charges Total Minutes 53  -KG       Total Minutes 53  -KG            User Key  (r) = Recorded By, (t) = Taken By, (c) = Cosigned By    Initials Name Provider Type    Cecy Sampson Physical Therapist              Therapy Charges for Today     Code Description Service Date Service Provider Modifiers Qty    37496661316 HC PT EVAL LOW COMPLEXITY 4 4/7/2022 Cecy Jason GP 1          PT G-Codes  Outcome Measure Options: AM-PAC 6 Clicks Basic Mobility (PT)  AM-PAC 6 Clicks Score (PT): 24    Cecy Jason  4/7/2022

## 2022-04-08 LAB
DEPRECATED RDW RBC AUTO: 44.9 FL (ref 37–54)
ERYTHROCYTE [DISTWIDTH] IN BLOOD BY AUTOMATED COUNT: 12.9 % (ref 12.3–15.4)
HCT VFR BLD AUTO: 36.2 % (ref 37.5–51)
HGB BLD-MCNC: 11.7 G/DL (ref 13–17.7)
MCH RBC QN AUTO: 30.9 PG (ref 26.6–33)
MCHC RBC AUTO-ENTMCNC: 32.3 G/DL (ref 31.5–35.7)
MCV RBC AUTO: 95.5 FL (ref 79–97)
PLATELET # BLD AUTO: 198 10*3/MM3 (ref 140–450)
PMV BLD AUTO: 10.7 FL (ref 6–12)
RBC # BLD AUTO: 3.79 10*6/MM3 (ref 4.14–5.8)
WBC NRBC COR # BLD: 14.32 10*3/MM3 (ref 3.4–10.8)

## 2022-04-08 PROCEDURE — 25010000002 SODIUM CHLORIDE 0.9 % WITH KCL 20 MEQ 20-0.9 MEQ/L-% SOLUTION: Performed by: COLON & RECTAL SURGERY

## 2022-04-08 PROCEDURE — 94799 UNLISTED PULMONARY SVC/PX: CPT

## 2022-04-08 PROCEDURE — 94761 N-INVAS EAR/PLS OXIMETRY MLT: CPT

## 2022-04-08 PROCEDURE — 25010000002 HEPARIN (PORCINE) PER 1000 UNITS: Performed by: COLON & RECTAL SURGERY

## 2022-04-08 PROCEDURE — 63710000001 PREDNISONE PER 5 MG: Performed by: INTERNAL MEDICINE

## 2022-04-08 PROCEDURE — 85027 COMPLETE CBC AUTOMATED: CPT | Performed by: INTERNAL MEDICINE

## 2022-04-08 PROCEDURE — 25010000002 ONDANSETRON PER 1 MG: Performed by: COLON & RECTAL SURGERY

## 2022-04-08 PROCEDURE — 25010000002 HYDROMORPHONE PER 4 MG: Performed by: COLON & RECTAL SURGERY

## 2022-04-08 PROCEDURE — 25010000002 LEVOFLOXACIN PER 250 MG: Performed by: COLON & RECTAL SURGERY

## 2022-04-08 RX ORDER — CALCIUM CARBONATE 200(500)MG
2 TABLET,CHEWABLE ORAL 3 TIMES DAILY PRN
Status: DISCONTINUED | OUTPATIENT
Start: 2022-04-08 | End: 2022-04-09 | Stop reason: HOSPADM

## 2022-04-08 RX ORDER — SIMETHICONE 80 MG
80 TABLET,CHEWABLE ORAL 4 TIMES DAILY PRN
Status: DISCONTINUED | OUTPATIENT
Start: 2022-04-08 | End: 2022-04-09 | Stop reason: HOSPADM

## 2022-04-08 RX ADMIN — POTASSIUM CHLORIDE AND SODIUM CHLORIDE 100 ML/HR: 900; 150 INJECTION, SOLUTION INTRAVENOUS at 12:39

## 2022-04-08 RX ADMIN — SIMETHICONE 80 MG: 80 TABLET, CHEWABLE ORAL at 21:03

## 2022-04-08 RX ADMIN — LEVOFLOXACIN 500 MG: 500 INJECTION, SOLUTION INTRAVENOUS at 20:56

## 2022-04-08 RX ADMIN — ACETAMINOPHEN 650 MG: 325 TABLET ORAL at 05:48

## 2022-04-08 RX ADMIN — HEPARIN SODIUM 5000 UNITS: 5000 INJECTION, SOLUTION INTRAVENOUS; SUBCUTANEOUS at 20:55

## 2022-04-08 RX ADMIN — AMLODIPINE BESYLATE 10 MG: 10 TABLET ORAL at 08:40

## 2022-04-08 RX ADMIN — THEOPHYLLINE ANHYDROUS 400 MG: 400 CAPSULE, EXTENDED RELEASE ORAL at 20:53

## 2022-04-08 RX ADMIN — TAMSULOSIN HYDROCHLORIDE 0.4 MG: 0.4 CAPSULE ORAL at 08:40

## 2022-04-08 RX ADMIN — POTASSIUM CHLORIDE AND SODIUM CHLORIDE 100 ML/HR: 900; 150 INJECTION, SOLUTION INTRAVENOUS at 02:35

## 2022-04-08 RX ADMIN — METRONIDAZOLE 500 MG: 500 INJECTION, SOLUTION INTRAVENOUS at 12:38

## 2022-04-08 RX ADMIN — HYDROMORPHONE HYDROCHLORIDE 0.5 MG: 1 INJECTION, SOLUTION INTRAMUSCULAR; INTRAVENOUS; SUBCUTANEOUS at 02:37

## 2022-04-08 RX ADMIN — DIAZEPAM 5 MG: 5 TABLET ORAL at 02:37

## 2022-04-08 RX ADMIN — METRONIDAZOLE 500 MG: 500 INJECTION, SOLUTION INTRAVENOUS at 20:56

## 2022-04-08 RX ADMIN — HYDROCODONE BITARTRATE AND ACETAMINOPHEN 1 TABLET: 7.5; 325 TABLET ORAL at 08:40

## 2022-04-08 RX ADMIN — METRONIDAZOLE 500 MG: 500 INJECTION, SOLUTION INTRAVENOUS at 05:49

## 2022-04-08 RX ADMIN — FAMOTIDINE 20 MG: 20 TABLET, FILM COATED ORAL at 08:40

## 2022-04-08 RX ADMIN — MONTELUKAST 10 MG: 10 TABLET, FILM COATED ORAL at 20:54

## 2022-04-08 RX ADMIN — ANTACID TABLETS 2 TABLET: 500 TABLET, CHEWABLE ORAL at 18:23

## 2022-04-08 RX ADMIN — ONDANSETRON 4 MG: 2 INJECTION INTRAMUSCULAR; INTRAVENOUS at 08:39

## 2022-04-08 RX ADMIN — THEOPHYLLINE ANHYDROUS 400 MG: 400 CAPSULE, EXTENDED RELEASE ORAL at 08:54

## 2022-04-08 RX ADMIN — HEPARIN SODIUM 5000 UNITS: 5000 INJECTION, SOLUTION INTRAVENOUS; SUBCUTANEOUS at 05:48

## 2022-04-08 RX ADMIN — ACETAMINOPHEN 650 MG: 325 TABLET ORAL at 15:50

## 2022-04-08 RX ADMIN — SIMETHICONE 80 MG: 80 TABLET, CHEWABLE ORAL at 12:38

## 2022-04-08 RX ADMIN — GABAPENTIN 300 MG: 300 CAPSULE ORAL at 18:23

## 2022-04-08 RX ADMIN — PREDNISONE 10 MG: 10 TABLET ORAL at 08:40

## 2022-04-08 RX ADMIN — FAMOTIDINE 20 MG: 20 TABLET, FILM COATED ORAL at 20:54

## 2022-04-08 RX ADMIN — GABAPENTIN 300 MG: 300 CAPSULE ORAL at 08:40

## 2022-04-08 RX ADMIN — ACETAMINOPHEN 650 MG: 325 TABLET ORAL at 20:55

## 2022-04-08 RX ADMIN — Medication 1 PATCH: at 08:41

## 2022-04-08 NOTE — PROGRESS NOTES
Voiding  A little bloated with nausea today  Audible gas per stoma during my evaluation today    Abdomen with mild distention, gas and stool per stoma  Midline incision inspected, no evidence of inguinal or other abdominal wall hernia  Perineum wound appears satisfactory as well    Labs reviewed    Discharge instructions reviewed.  Patient anxious to go home  Given his limited ability for transport, I want a make sure he is on solid condition before discharge.  Good progress

## 2022-04-08 NOTE — CASE MANAGEMENT/SOCIAL WORK
Continued Stay Note  [facility]     Patient Name: Alfredo Sandhu  MRN: [id number]  Today's Date: 4/8/2022    Admit Date: 4/5/2022     Discharge Plan     Row Name 04/08/22 1641       Plan    Plan home/hh    Patient/Family in Agreement with Plan yes    Plan Comments I called wife Alyssa and then spoke with neaurelio. I told Alyssa that patient may go home this weekend and is she intersted in . Alyssa wants HH and neaurelio works in a nursing home and knows how to change applicances. So, [facility] and gave referral for nursing for ostomy care/teaching. I faxed order and clinical infor to [phone]. HH gone for the day since I called after 4pm and no one there to get this pre-auth, they take patient's insurance and felt it would be ok but unable to see patient til Monday. Vianney @ [phone]    Final Discharge Disposition Code 06 - home with home health care               Discharge Codes    No documentation.                     Karishma Gomez RN

## 2022-04-08 NOTE — NURSING NOTE
Woc follow up for ostomy education     Surgery date: 04-     Ostomy type: Descending colostomy     Appliance in place: [place] 8331     Last pouch change: 04/07/2020     Stoma Assessment:  Stoma is round, red, moist, edematous and budded. Brown output noted in a pouch.  Pouch was changed yesterday with WOC and appliance is intact.     Stoma measured 56 mm yesterday.     Education performed: Education done with patient regarding pouching procedure, ordering reviewed, and discharge supplies placed in room.  Patient wanted to order closed ended appliances and education done with this.  Education also done regarding diet, ostomy supplies, home ADLs and when to change appliance.  Patient did not have any further questions and WOCN will follow up    Woc will continue to follow.      Please contact with questions or concerns.     HW

## 2022-04-08 NOTE — PROGRESS NOTES
"IM progress note      Alfredo Sandhu  [id number]  1960     LOS: 3 days     Attending: Augustus Breaux MD    Primary Care Provider: Kenia Galarza APRN      Chief Complaint/Reason for visit:  No chief complaint on file.      Subjective   22:Feels okay.  Some postoperative pain.  Tolerated full liquis, no nausea or vomiting. Stoma with output. No f/c.    22:  Doing better.  Ambulating.  Feeling some bloating in the upper abdomen.  Minimal nausea.  Stoma bag empty when seen earlier in the day.  Voiding after Chow removal.  Objective      Visit Vitals  /67 (BP Location: Right arm, Patient Position: Sitting)   Pulse 107   Temp 98.2 °F (36.8 °C) (Oral)   Resp 24   Ht 172.7 cm (68\")   Wt 90.3 kg (199 lb)   SpO2 95%   BMI 30.26 kg/m²     Temp (24hrs), Av.4 °F (36.9 °C), Min:98.2 °F (36.8 °C), Max:99 °F (37.2 °C)      Intake/Output:    Intake/Output Summary (Last 24 hours) at 2022 1210  Last data filed at 2022 0850  Gross per 24 hour   Intake 290 ml   Output 1080 ml   Net -790 ml          Physical Exam:     General Appearance:    Alert, cooperative, in no acute distress   Head:    Normocephalic, without obvious abnormality, atraumatic    Lungs:     Normal effort, symmetric chest rise, bilateral wheezing.  Slightly decreased breath sounds.    Heart:    Regular rhythm and normal rate, normal S1 and S2   Abdomen:    Soft with mild distention.  Dressing intact.  Stoma pink with output .  MONICA drain present.      Extremities:   No clubbing, cyanosis or edema.  No deformities.    Pulses:   Pulses palpable and equal bilaterally   Skin:   No bleeding, bruising or rash          Results Review:     I reviewed the patient's new clinical results.   Results from last 7 days   Lab Units 22  0601 22  0558 22  1607   WBC 10*3/mm3 14.14* 16.04* 8.76   HEMOGLOBIN g/dL 11.6* 12.7* 14.1   HEMATOCRIT % 37.1* 41.0 43.6   PLATELETS 10*3/mm3 175 196 215     Results from last 7 days   Lab Units " 04/07/22  0601 04/06/22  0558 04/04/22  1607   SODIUM mmol/L 139 137 142   POTASSIUM mmol/L 4.3 4.7 4.6   CHLORIDE mmol/L 104 101 104   CO2 mmol/L 28.0 26.0 28.0   BUN mg/dL 8 7* 6*   CREATININE mg/dL 0.58* 0.57* 0.68*   CALCIUM mg/dL 8.8 8.6 9.8   BILIRUBIN mg/dL 0.2  --  0.2   ALK PHOS U/L 73  --  87   ALT (SGPT) U/L 8  --  8   AST (SGOT) U/L 17  --  12   GLUCOSE mg/dL 128* 148* 127*     I reviewed the patient's new imaging including images and reports.    All medications reviewed.     Pharmacy Consult, , Does not apply, BID  acetaminophen, 650 mg, Oral, Q8H  amLODIPine, 10 mg, Oral, Daily  budesonide-formoterol, 2 puff, Inhalation, BID - RT  famotidine, 20 mg, Oral, BID  gabapentin, 300 mg, Oral, TID  heparin (porcine), 5,000 Units, Subcutaneous, Q8H  ipratropium-albuterol, 3 mL, Nebulization, 4x Daily - RT  levoFLOXacin, 500 mg, Intravenous, Q24H  metroNIDAZOLE, 500 mg, Intravenous, Q8H  montelukast, 10 mg, Oral, Nightly  nicotine, 1 patch, Transdermal, Q24H  predniSONE, 10 mg, Oral, Daily  tamsulosin, 0.4 mg, Oral, Daily  theophylline, 400 mg, Oral, Q12H      Assessment/Plan      Status post exploratory laparotomy with LAR, total mesorectal resection, colostomy  Status post cystoscopy, ureteral catheter placement bilaterally    Rectal cancer (HCC)    COPD (chronic obstructive pulmonary disease) (HCC)    Tobacco abuse    HTN (hypertension)    Rectal bleeding    Postop leukocytosis, in part reactive and steroid induced.     Plan:    1. Ambulation, several times daily.   2. Pain control-prns       3. IS-encourage  4. DVT proph- Mechanical, subcutaneous heparin  5. Bowel regimen, alvimopan  6. Resume home medications as appropriate  7. Monitor post-op labs, improving Leukocytosis.  8. DC planning.  Possibly home tomorrow if reliable bowel function continues to tolerate p.o.  9. Diet, Soft GI.      - Hypertension:  Resumed home medications as appropriate, formulary substitution when indicated.  Holding  parameters.  Prn medications for elevated blood pressure.     -COPD, ongoing tobacco abuse:  Continue  bronchodilators, monitor oxygen status, encourage incentive spirometer. Back on daily prednisone after stress dosing.         -New stoma:  Wound care stoma nurse consult for stoma care and education throughout patient's hospitalization.  Ongoing     - Voiding after Chow removal.      Highly complex case       Dragon disclaimer:  Part of this encounter note is an electronic transcription/translation of spoken language to printed text. The electronic translation of spoken language may permit erroneous, or at times, nonsensical words or phrases to be inadvertently transcribed; Although I have reviewed the note for such errors, some may still exist.    Joe Chery MD  04/08/22  12:10 EDT

## 2022-04-09 ENCOUNTER — READMISSION MANAGEMENT (OUTPATIENT)
Dept: CALL CENTER | Facility: HOSPITAL | Age: 62
End: 2022-04-09

## 2022-04-09 VITALS
HEIGHT: 68 IN | SYSTOLIC BLOOD PRESSURE: 144 MMHG | RESPIRATION RATE: 19 BRPM | HEART RATE: 114 BPM | OXYGEN SATURATION: 95 % | DIASTOLIC BLOOD PRESSURE: 76 MMHG | TEMPERATURE: 98.1 F | WEIGHT: 199 LBS | BODY MASS INDEX: 30.16 KG/M2

## 2022-04-09 LAB
CYTO UR: NORMAL
DEPRECATED RDW RBC AUTO: 46.3 FL (ref 37–54)
ERYTHROCYTE [DISTWIDTH] IN BLOOD BY AUTOMATED COUNT: 12.9 % (ref 12.3–15.4)
HCT VFR BLD AUTO: 37.4 % (ref 37.5–51)
HGB BLD-MCNC: 11.8 G/DL (ref 13–17.7)
LAB AP CASE REPORT: NORMAL
LAB AP CLINICAL INFORMATION: NORMAL
MCH RBC QN AUTO: 30.5 PG (ref 26.6–33)
MCHC RBC AUTO-ENTMCNC: 31.6 G/DL (ref 31.5–35.7)
MCV RBC AUTO: 96.6 FL (ref 79–97)
PATH REPORT.FINAL DX SPEC: NORMAL
PATH REPORT.GROSS SPEC: NORMAL
PLATELET # BLD AUTO: 219 10*3/MM3 (ref 140–450)
PMV BLD AUTO: 10.7 FL (ref 6–12)
RBC # BLD AUTO: 3.87 10*6/MM3 (ref 4.14–5.8)
WBC NRBC COR # BLD: 13.59 10*3/MM3 (ref 3.4–10.8)

## 2022-04-09 PROCEDURE — 63710000001 PREDNISONE PER 5 MG: Performed by: INTERNAL MEDICINE

## 2022-04-09 PROCEDURE — 85027 COMPLETE CBC AUTOMATED: CPT | Performed by: INTERNAL MEDICINE

## 2022-04-09 RX ORDER — METRONIDAZOLE 500 MG/1
500 TABLET ORAL 2 TIMES DAILY
Qty: 10 TABLET | Refills: 0 | Status: SHIPPED | OUTPATIENT
Start: 2022-04-09

## 2022-04-09 RX ORDER — HYDROCODONE BITARTRATE AND ACETAMINOPHEN 7.5; 325 MG/1; MG/1
1 TABLET ORAL EVERY 4 HOURS PRN
Qty: 25 TABLET | Refills: 0 | Status: SHIPPED | OUTPATIENT
Start: 2022-04-09 | End: 2022-04-15

## 2022-04-09 RX ORDER — LEVOFLOXACIN 500 MG/1
500 TABLET, FILM COATED ORAL DAILY
Qty: 5 TABLET | Refills: 0 | Status: SHIPPED | OUTPATIENT
Start: 2022-04-09

## 2022-04-09 RX ADMIN — PREDNISONE 10 MG: 10 TABLET ORAL at 08:04

## 2022-04-09 RX ADMIN — AMLODIPINE BESYLATE 10 MG: 10 TABLET ORAL at 08:04

## 2022-04-09 RX ADMIN — TAMSULOSIN HYDROCHLORIDE 0.4 MG: 0.4 CAPSULE ORAL at 08:04

## 2022-04-09 RX ADMIN — SIMETHICONE 80 MG: 80 TABLET, CHEWABLE ORAL at 08:08

## 2022-04-09 RX ADMIN — FAMOTIDINE 20 MG: 20 TABLET, FILM COATED ORAL at 08:04

## 2022-04-09 RX ADMIN — THEOPHYLLINE ANHYDROUS 400 MG: 400 CAPSULE, EXTENDED RELEASE ORAL at 08:05

## 2022-04-09 NOTE — NURSING NOTE
Discharge education completed with patient and family at bedside. New prescriptions were brought up by  pharmacy and discussed with patient and spouse. Both verbalized understanding and written material given over each medication. Instructed patient on incisional care and signs of symptoms of infection. Dressing was changed prior to discharge and patient noted incision appearance. Ostomy appliances and material given to patient for discharge, Patient will have HH set up to assist with care. Discharge appointments were discussed. Pt verbalized understanding of needing to call both PCP and Dr. Breaux offices on Monday to make follow up appointments. Both patient and spouse acknowledged discharge instructions and  Voiced no further questions or concerns at this time. IV removed. MONICA drain removed. All belongings accounted for.

## 2022-04-10 NOTE — OUTREACH NOTE
Prep Survey    Flowsheet Row Responses   [facility] facility patient discharged from? [place]   Is LACE score < 7 ? No   Emergency Room discharge w/ pulse ox? No   Eligibility Readm Mgmt   Discharge diagnosis Rectal cancer-resection with permanent colostomy   Does the patient have one of the following disease processes/diagnoses(primary or secondary)? General Surgery   Does the patient have Home health ordered? Yes   What is the Home health agency?  [facility]    Is there a DME ordered? No   Prep survey completed? Yes          DAWNA LOREDO - Registered Nurse

## 2022-04-13 ENCOUNTER — READMISSION MANAGEMENT (OUTPATIENT)
Dept: CALL CENTER | Facility: HOSPITAL | Age: 62
End: 2022-04-13

## 2022-04-13 NOTE — OUTREACH NOTE
General Surgery Week 1 Survey    Flowsheet Row Responses   [facility] facility patient discharged from? [place]   Does the patient have one of the following disease processes/diagnoses(primary or secondary)? General Surgery   Week 1 attempt successful? No   Unsuccessful attempts Attempt 1          ADY THOMPSON - Registered Nurse

## 2022-04-19 ENCOUNTER — READMISSION MANAGEMENT (OUTPATIENT)
Dept: CALL CENTER | Facility: HOSPITAL | Age: 62
End: 2022-04-19

## 2022-06-02 ENCOUNTER — NURSE NAVIGATOR (OUTPATIENT)
Dept: ONCOLOGY | Facility: CLINIC | Age: 62
End: 2022-06-02

## 2022-06-02 NOTE — PROGRESS NOTES
Received phone call from John at [facility] requesting slides be returned ASAP. Notifed Raysa in pathology of request and contact number to call John for any further questions.

## 2022-09-18 NOTE — OUTREACH NOTE
General Surgery Week 1 Survey    Flowsheet Row Responses   [facility] patient discharged from? [place]   Does the patient have one of the following disease processes/diagnoses(primary or secondary)? General Surgery   Week 1 attempt successful? Yes   Call start time [id number]   Call end time 1606   Meds reviewed with patient/caregiver? Yes   Is the patient having any side effects they believe may be caused by any medication additions or changes? No   Does the patient have all medications related to this admission filled (includes all antibiotics, pain medications, etc.) Yes   Is the patient taking all medications as directed (includes completed medication regime)? Yes   Does the patient have a follow up appointment scheduled with their surgeon? Yes   Has the patient kept scheduled appointments due by today? N/A   Comments Surgeon appt 04/21/22.    Has home health visited the patient within 72 hours of discharge? Call prior to 72 hours   Home health comments Patient states declined HH since he's doing well.   Psychosocial issues? No   Did the patient receive a copy of their discharge instructions? Yes   Nursing interventions Reviewed instructions with patient   What is the patient's perception of their health status since discharge? Improving   Nursing interventions Nurse provided patient education   Is the patient /caregiver able to teach back basic post-op care? Continue use of incentive spirometry at least 1 week post discharge, Drive as instructed by MD in discharge instructions, No tub bath, swimming, or hot tub until instructed by MD, Practice 'cough and deep breath', Take showers only when approved by MD-sponge bathe until then, Keep incision areas clean,dry and protected, Lifting as instructed by MD in discharge instructions, Do not remove steri-strips   Is the patient/caregiver able to teach back signs and symptoms of incisional infection? Increased redness, swelling or pain at the incisonal site,  Problem: Discharge Planning  Goal: Discharge to home or other facility with appropriate resources  Outcome: Progressing  Flowsheets (Taken 9/17/2022 2014)  Discharge to home or other facility with appropriate resources:   Identify barriers to discharge with patient and caregiver   Arrange for needed discharge resources and transportation as appropriate   Identify discharge learning needs (meds, wound care, etc)     Problem: Safety - Adult  Goal: Free from fall injury  Outcome: Progressing  Flowsheets (Taken 9/17/2022 2017)  Free From Fall Injury: Instruct family/caregiver on patient safety  Note: Pt is a Fall Risk. See Isidoro Hammonds Fall Risk Score. Pt bed in low position and side rails up. Call light and belongings in reach. Pt encouraged to call for assistance. Will continue with hourly rounds for PO intake, pain needs, toileting, and repositioning as needed. Problem: ABCDS Injury Assessment  Goal: Absence of physical injury  Outcome: Progressing  Flowsheets (Taken 9/17/2022 2017)  Absence of Physical Injury: Implement safety measures based on patient assessment     Problem: Skin/Tissue Integrity  Goal: Absence of new skin breakdown  Description: 1. Monitor for areas of redness and/or skin breakdown  2. Assess vascular access sites hourly  3. Every 4-6 hours minimum:  Change oxygen saturation probe site  4. Every 4-6 hours:  If on nasal continuous positive airway pressure, respiratory therapy assess nares and determine need for appliance change or resting period. Outcome: Progressing  Note: Pt turned q2 hours and nayana care provided as needed. Pt is on low-air loss mattress to prevent skin breakdown. No skin breakdown noted this shift.       Problem: Chronic Conditions and Co-morbidities  Goal: Patient's chronic conditions and co-morbidity symptoms are monitored and maintained or improved  Outcome: Progressing  Flowsheets (Taken 9/17/2022 2014)  Care Plan - Patient's Chronic Conditions and Co-Morbidity "Incisional warmth, Fever, Increased drainage or bleeding, Pus or odor from incision   Is the patient/caregiver able to teach back steps to recovery at home? Set small, achievable goals for return to baseline health, Eat a well-balance diet, Rest and rebuild strength, gradually increase activity, Make a list of questions for surgeon's appointment, Practice good oral hygiene, Weigh daily   If the patient is a current smoker, are they able to teach back resources for cessation? Smoking cessation medications, Smoking cessation classes, Smoking cessation support groups, [phone]  [Smokes 1/2 packs per day.]   Is the patient/caregiver able to teach back the hierarchy of who to call/visit for symptoms/problems? PCP, Specialist, Home health nurse, Urgent Care, ED, 911 Yes   Week 1 call completed? Yes   Wrap up additional comments States is improving. States appetite \"okay\"-discussed supplementing with Glucerna if needed. Denies any s/s of infection at incision currently-states finished Bactrim that was prescribed for redness at incision. Denies any problems with his colostomy. Denies any needs today.          CHACORTA MESA - Registered Nurse  " Symptoms are Monitored and Maintained or Improved:   Monitor and assess patient's chronic conditions and comorbid symptoms for stability, deterioration, or improvement   Collaborate with multidisciplinary team to address chronic and comorbid conditions and prevent exacerbation or deterioration   Update acute care plan with appropriate goals if chronic or comorbid symptoms are exacerbated and prevent overall improvement and discharge     Problem: Cardiovascular - Adult  Goal: Maintains optimal cardiac output and hemodynamic stability  Outcome: Progressing  Flowsheets (Taken 9/17/2022 2014)  Maintains optimal cardiac output and hemodynamic stability:   Monitor blood pressure and heart rate   Monitor urine output and notify Licensed Independent Practitioner for values outside of normal range     Problem: Cardiovascular - Adult  Goal: Absence of cardiac dysrhythmias or at baseline  Outcome: Progressing  Flowsheets (Taken 9/17/2022 2014)  Absence of cardiac dysrhythmias or at baseline: Monitor cardiac rate and rhythm     Problem: Respiratory - Adult  Goal: Achieves optimal ventilation and oxygenation  Outcome: Progressing  Flowsheets (Taken 9/17/2022 2014)  Achieves optimal ventilation and oxygenation:   Assess for changes in respiratory status   Assess for changes in mentation and behavior  Note: Pt on 1L oxygen via nasal cannula. Unable to wean pt this evening. Oxygenation >95% this shift.       Problem: Metabolic/Fluid and Electrolytes - Adult  Goal: Electrolytes maintained within normal limits  Outcome: Progressing  Flowsheets (Taken 9/17/2022 2014)  Electrolytes maintained within normal limits:   Monitor labs and assess patient for signs and symptoms of electrolyte imbalances   Administer electrolyte replacement as ordered     Problem: Metabolic/Fluid and Electrolytes - Adult  Goal: Hemodynamic stability and optimal renal function maintained  Outcome: Progressing  Flowsheets (Taken 9/17/2022 2014)  Hemodynamic stability and optimal renal function maintained: Monitor labs and assess for signs and symptoms of volume excess or deficit     Problem: Pain  Goal: Verbalizes/displays adequate comfort level or baseline comfort level  Outcome: Progressing  Flowsheets (Taken 9/18/2022 0233)  Verbalizes/displays adequate comfort level or baseline comfort level:   Encourage patient to monitor pain and request assistance   Assess pain using appropriate pain scale  Note: No c/o pain this shift.

## (undated) DEVICE — TOTAL TRAY, 16FR 10ML SIL FOLEY, URN: Brand: MEDLINE

## (undated) DEVICE — GOWN,REINFORCE,POLY,SIRUS,BREATH SLV,XLG: Brand: MEDLINE

## (undated) DEVICE — TBG PENCL TELESCP MEGADYNE SMOKE EVAC 10FT

## (undated) DEVICE — LEX BASIC NO DRAPE: Brand: MEDLINE INDUSTRIES, INC.

## (undated) DEVICE — MEDI-VAC YANKAUER SUCTION HANDLE: Brand: CARDINAL HEALTH

## (undated) DEVICE — ANTIBACTERIAL UNDYED BRAIDED (POLYGLACTIN 910), SYNTHETIC ABSORBABLE SUTURE: Brand: COATED VICRYL

## (undated) DEVICE — GLV SURG SENSICARE PI MIC PF SZ7.5 LF STRL

## (undated) DEVICE — GLV SURG SENSICARE PI ORTHO PF SZ7 LF STRL

## (undated) DEVICE — JELLY,LUBE,STERILE,FLIP TOP,TUBE,2-OZ: Brand: MEDLINE

## (undated) DEVICE — INTENDED FOR TISSUE SEPARATION, AND OTHER PROCEDURES THAT REQUIRE A SHARP SURGICAL BLADE TO PUNCTURE OR CUT.: Brand: BARD-PARKER ® STAINLESS STEEL BLADES

## (undated) DEVICE — TUBING, SUCTION, 1/4" X 10', STRAIGHT: Brand: MEDLINE

## (undated) DEVICE — TOWEL,OR,DSP,ST,BLUE,STD,4/PK,20PK/CS: Brand: MEDLINE

## (undated) DEVICE — SUT VIC PLS CTD ANTIB 2/0 18IN VCP111G

## (undated) DEVICE — SUT PDS LP 1 TP1 96IN VIO PDP880GA

## (undated) DEVICE — DRAPE,UNDERBUTTOCKS,STERILE: Brand: MEDLINE

## (undated) DEVICE — TRENDELENBURG WINGPAD POSITIONING KIT DELUXE - WITHOUT BODY STRAP: Brand: SOULE MEDICAL

## (undated) DEVICE — INTENDED TO BE USED TO OCCLUDE, RETRACT AND IDENTIFY ARTERIES, VEINS, TENDONS AND NERVES IN SURGICAL PROCEDURES: Brand: STERION®  VESSEL LOOP

## (undated) DEVICE — APPL CHLORAPREP TINTED 26ML TEAL

## (undated) DEVICE — LEGGINGS, PAIR, 29X43, STERILE: Brand: MEDLINE

## (undated) DEVICE — SPNG LAP PREWSH SFTPK 18X18IN STRL PK/5

## (undated) DEVICE — DBD-DRAPE,LAP,CHOLE,W/TROUGHS,STERILE: Brand: MEDLINE

## (undated) DEVICE — SUT MNCRYL PLS ANTIB UD 4/0 PS2 18IN

## (undated) DEVICE — ADHS SKIN PREMIERPRO EXOFIN TOPICAL HI/VISC .5ML

## (undated) DEVICE — SAFESECURE,SECUREMENT,FOLEY CATH,STERILE: Brand: MEDLINE

## (undated) DEVICE — DEV OPN LIGASURE CRV 180D 36MM 13.5CM  1P/U

## (undated) DEVICE — 3M™ IOBAN™ 2 ANTIMICROBIAL INCISE DRAPE 6650EZ: Brand: IOBAN™ 2

## (undated) DEVICE — GLV SURG SENSICARE PI MIC PF SZ6.5 LF STRL

## (undated) DEVICE — POOLE SUCTION INSTRUMENT WITH REMOVABLE SHEATH: Brand: POOLE

## (undated) DEVICE — PROXIMATE RH ROTATING HEAD SKIN STAPLERS (35 REGULAR) CONTAINS 35 STAINLESS STEEL STAPLES: Brand: PROXIMATE

## (undated) DEVICE — PREMIUM DRY TRAY LF: Brand: MEDLINE INDUSTRIES, INC.